# Patient Record
Sex: MALE | Race: OTHER | HISPANIC OR LATINO
[De-identification: names, ages, dates, MRNs, and addresses within clinical notes are randomized per-mention and may not be internally consistent; named-entity substitution may affect disease eponyms.]

---

## 2019-10-07 PROBLEM — Z00.00 ENCOUNTER FOR PREVENTIVE HEALTH EXAMINATION: Status: ACTIVE | Noted: 2019-10-07

## 2019-10-17 ENCOUNTER — APPOINTMENT (OUTPATIENT)
Dept: SURGERY | Facility: CLINIC | Age: 44
End: 2019-10-17
Payer: COMMERCIAL

## 2019-10-17 VITALS
BODY MASS INDEX: 28.88 KG/M2 | WEIGHT: 195 LBS | SYSTOLIC BLOOD PRESSURE: 117 MMHG | HEIGHT: 69 IN | HEART RATE: 78 BPM | DIASTOLIC BLOOD PRESSURE: 79 MMHG

## 2019-10-17 DIAGNOSIS — Z78.9 OTHER SPECIFIED HEALTH STATUS: ICD-10-CM

## 2019-10-17 PROCEDURE — 99203 OFFICE O/P NEW LOW 30 MIN: CPT

## 2019-10-20 NOTE — PHYSICAL EXAM
[de-identified] : left submandibular fullness, compressible [de-identified] : no palpable thyroid nodules [Laryngoscopy Performed] : laryngoscopy was performed, see procedure section for findings [Midline] : located in midline position [Normal] : cranial nerves 2-12 intact [de-identified] : could not cooperate for either indirect or flexible laryngoscopy

## 2019-10-20 NOTE — CONSULT LETTER
[Consult Letter:] : I had the pleasure of evaluating your patient, [unfilled]. [Dear  ___] : Dear ~MANUEL, [Please see my note below.] : Please see my note below. [Sincerely,] : Sincerely, [FreeTextEntry2] : Dr. Jitendra Fan [Consult Closing:] : Thank you very much for allowing me to participate in the care of this patient.  If you have any questions, please do not hesitate to contact me. [FreeTextEntry3] : Jacobo Turk MD, FACS\par System Director, Endocrine Surgery\par [facility]\par

## 2019-10-20 NOTE — HISTORY OF PRESENT ILLNESS
[de-identified] : Pt c/o neck masses since 2002 which vary is size spontaneously. notes occasional discomfort.  denies pain, drainage, infection, sour taste, dysphagia, hoarseness.    history of [place] exposure. \par CT:   left submandibular 8.3 cm mass, Right 6.7 cm submandibular mass Fullness Right hypopharynx

## 2019-10-21 ENCOUNTER — FORM ENCOUNTER (OUTPATIENT)
Age: 44
End: 2019-10-21

## 2019-10-22 ENCOUNTER — OUTPATIENT (OUTPATIENT)
Dept: OUTPATIENT SERVICES | Facility: HOSPITAL | Age: 44
LOS: 1 days | End: 2019-10-22
Payer: COMMERCIAL

## 2019-10-22 ENCOUNTER — APPOINTMENT (OUTPATIENT)
Dept: MRI IMAGING | Facility: IMAGING CENTER | Age: 44
End: 2019-10-22

## 2019-10-22 DIAGNOSIS — R22.1 LOCALIZED SWELLING, MASS AND LUMP, NECK: ICD-10-CM

## 2019-10-22 PROCEDURE — 70543 MRI ORBT/FAC/NCK W/O &W/DYE: CPT | Mod: 26

## 2019-10-22 PROCEDURE — A9585: CPT

## 2019-10-22 PROCEDURE — 70543 MRI ORBT/FAC/NCK W/O &W/DYE: CPT

## 2019-10-29 ENCOUNTER — OTHER (OUTPATIENT)
Age: 44
End: 2019-10-29

## 2019-11-11 ENCOUNTER — FORM ENCOUNTER (OUTPATIENT)
Age: 44
End: 2019-11-11

## 2019-11-12 ENCOUNTER — RESULT REVIEW (OUTPATIENT)
Age: 44
End: 2019-11-12

## 2019-11-12 ENCOUNTER — OUTPATIENT (OUTPATIENT)
Dept: OUTPATIENT SERVICES | Facility: HOSPITAL | Age: 44
LOS: 1 days | End: 2019-11-12
Payer: COMMERCIAL

## 2019-11-12 ENCOUNTER — APPOINTMENT (OUTPATIENT)
Dept: ULTRASOUND IMAGING | Facility: IMAGING CENTER | Age: 44
End: 2019-11-12
Payer: COMMERCIAL

## 2019-11-12 DIAGNOSIS — R22.1 LOCALIZED SWELLING, MASS AND LUMP, NECK: ICD-10-CM

## 2019-11-12 PROCEDURE — 88365 INSITU HYBRIDIZATION (FISH): CPT | Mod: 26,59

## 2019-11-12 PROCEDURE — 20206 BIOPSY MUSCLE PERQ NEEDLE: CPT

## 2019-11-12 PROCEDURE — 87205 SMEAR GRAM STAIN: CPT

## 2019-11-12 PROCEDURE — 88341 IMHCHEM/IMCYTCHM EA ADD ANTB: CPT | Mod: 26,59

## 2019-11-12 PROCEDURE — 88307 TISSUE EXAM BY PATHOLOGIST: CPT

## 2019-11-12 PROCEDURE — 88189 FLOWCYTOMETRY/READ 16 & >: CPT

## 2019-11-12 PROCEDURE — 88185 FLOWCYTOMETRY/TC ADD-ON: CPT

## 2019-11-12 PROCEDURE — 88307 TISSUE EXAM BY PATHOLOGIST: CPT | Mod: 26

## 2019-11-12 PROCEDURE — 88364 INSITU HYBRIDIZATION (FISH): CPT | Mod: 26

## 2019-11-12 PROCEDURE — 76942 ECHO GUIDE FOR BIOPSY: CPT

## 2019-11-12 PROCEDURE — 76942 ECHO GUIDE FOR BIOPSY: CPT | Mod: 26

## 2019-11-12 PROCEDURE — 88360 TUMOR IMMUNOHISTOCHEM/MANUAL: CPT

## 2019-11-12 PROCEDURE — 88364 INSITU HYBRIDIZATION (FISH): CPT

## 2019-11-12 PROCEDURE — 88173 CYTOPATH EVAL FNA REPORT: CPT

## 2019-11-12 PROCEDURE — 88184 FLOWCYTOMETRY/ TC 1 MARKER: CPT

## 2019-11-12 PROCEDURE — 88342 IMHCHEM/IMCYTCHM 1ST ANTB: CPT | Mod: 26,59

## 2019-11-12 PROCEDURE — 88365 INSITU HYBRIDIZATION (FISH): CPT

## 2019-11-12 PROCEDURE — 88360 TUMOR IMMUNOHISTOCHEM/MANUAL: CPT | Mod: 26

## 2019-11-12 PROCEDURE — 88342 IMHCHEM/IMCYTCHM 1ST ANTB: CPT

## 2019-11-12 PROCEDURE — 88341 IMHCHEM/IMCYTCHM EA ADD ANTB: CPT

## 2019-11-12 PROCEDURE — 88173 CYTOPATH EVAL FNA REPORT: CPT | Mod: 26

## 2019-11-12 PROCEDURE — 88172 CYTP DX EVAL FNA 1ST EA SITE: CPT

## 2019-11-15 LAB — TM INTERPRETATION: SIGNIFICANT CHANGE UP

## 2019-11-19 LAB — NON-GYNECOLOGICAL CYTOLOGY STUDY: SIGNIFICANT CHANGE UP

## 2019-11-22 ENCOUNTER — OTHER (OUTPATIENT)
Age: 44
End: 2019-11-22

## 2019-12-16 ENCOUNTER — OUTPATIENT (OUTPATIENT)
Dept: OUTPATIENT SERVICES | Facility: HOSPITAL | Age: 44
LOS: 1 days | End: 2019-12-16
Payer: COMMERCIAL

## 2019-12-16 VITALS
TEMPERATURE: 99 F | HEART RATE: 86 BPM | SYSTOLIC BLOOD PRESSURE: 120 MMHG | RESPIRATION RATE: 16 BRPM | HEIGHT: 68 IN | OXYGEN SATURATION: 98 % | DIASTOLIC BLOOD PRESSURE: 70 MMHG | WEIGHT: 190.04 LBS

## 2019-12-16 DIAGNOSIS — R59.9 ENLARGED LYMPH NODES, UNSPECIFIED: ICD-10-CM

## 2019-12-16 DIAGNOSIS — Z98.890 OTHER SPECIFIED POSTPROCEDURAL STATES: Chronic | ICD-10-CM

## 2019-12-16 LAB
ANION GAP SERPL CALC-SCNC: 14 MMO/L — SIGNIFICANT CHANGE UP (ref 7–14)
BUN SERPL-MCNC: 16 MG/DL — SIGNIFICANT CHANGE UP (ref 7–23)
CALCIUM SERPL-MCNC: 9.9 MG/DL — SIGNIFICANT CHANGE UP (ref 8.4–10.5)
CHLORIDE SERPL-SCNC: 103 MMOL/L — SIGNIFICANT CHANGE UP (ref 98–107)
CO2 SERPL-SCNC: 24 MMOL/L — SIGNIFICANT CHANGE UP (ref 22–31)
CREAT SERPL-MCNC: 1.01 MG/DL — SIGNIFICANT CHANGE UP (ref 0.5–1.3)
GLUCOSE SERPL-MCNC: 87 MG/DL — SIGNIFICANT CHANGE UP (ref 70–99)
HCT VFR BLD CALC: 42.6 % — SIGNIFICANT CHANGE UP (ref 39–50)
HGB BLD-MCNC: 13.6 G/DL — SIGNIFICANT CHANGE UP (ref 13–17)
MCHC RBC-ENTMCNC: 30.8 PG — SIGNIFICANT CHANGE UP (ref 27–34)
MCHC RBC-ENTMCNC: 31.9 % — LOW (ref 32–36)
MCV RBC AUTO: 96.6 FL — SIGNIFICANT CHANGE UP (ref 80–100)
NRBC # FLD: 0 K/UL — SIGNIFICANT CHANGE UP (ref 0–0)
PLATELET # BLD AUTO: 295 K/UL — SIGNIFICANT CHANGE UP (ref 150–400)
PMV BLD: 10.9 FL — SIGNIFICANT CHANGE UP (ref 7–13)
POTASSIUM SERPL-MCNC: 3.7 MMOL/L — SIGNIFICANT CHANGE UP (ref 3.5–5.3)
POTASSIUM SERPL-SCNC: 3.7 MMOL/L — SIGNIFICANT CHANGE UP (ref 3.5–5.3)
RBC # BLD: 4.41 M/UL — SIGNIFICANT CHANGE UP (ref 4.2–5.8)
RBC # FLD: 11.2 % — SIGNIFICANT CHANGE UP (ref 10.3–14.5)
SODIUM SERPL-SCNC: 141 MMOL/L — SIGNIFICANT CHANGE UP (ref 135–145)
WBC # BLD: 10.01 K/UL — SIGNIFICANT CHANGE UP (ref 3.8–10.5)
WBC # FLD AUTO: 10.01 K/UL — SIGNIFICANT CHANGE UP (ref 3.8–10.5)

## 2019-12-16 PROCEDURE — 93010 ELECTROCARDIOGRAM REPORT: CPT

## 2019-12-16 NOTE — H&P PST ADULT - HISTORY OF PRESENT ILLNESS
43y/o male presents for preop eval for scheduled left submental lymph node biopsy 12/27/2019. Per pt had swollen lymph node for several months.  Pt with h/o excision of lymph node from neck in 2002.  Per pt biopsy negative.

## 2019-12-16 NOTE — H&P PST ADULT - NSICDXPROBLEM_GEN_ALL_CORE_FT
PROBLEM DIAGNOSES  Problem: Enlarged lymph nodes, unspecified  Assessment and Plan: scheduled for left submental lymph node biopsy on 12/27/19  Written & verbal preop instructions, gi prophylaxis & surgical soap given  Pt verbalized good understanding.  Teach back done on surgical soap instructions.

## 2019-12-26 ENCOUNTER — TRANSCRIPTION ENCOUNTER (OUTPATIENT)
Age: 44
End: 2019-12-26

## 2019-12-26 PROBLEM — R59.9 ENLARGED LYMPH NODES, UNSPECIFIED: Chronic | Status: ACTIVE | Noted: 2019-12-16

## 2019-12-27 ENCOUNTER — APPOINTMENT (OUTPATIENT)
Dept: SURGERY | Facility: HOSPITAL | Age: 44
End: 2019-12-27

## 2019-12-27 ENCOUNTER — RESULT REVIEW (OUTPATIENT)
Age: 44
End: 2019-12-27

## 2019-12-27 ENCOUNTER — OUTPATIENT (OUTPATIENT)
Dept: OUTPATIENT SERVICES | Facility: HOSPITAL | Age: 44
LOS: 1 days | Discharge: HOME | End: 2019-12-27
Payer: COMMERCIAL

## 2019-12-27 ENCOUNTER — OTHER (OUTPATIENT)
Age: 44
End: 2019-12-27

## 2019-12-27 VITALS
OXYGEN SATURATION: 98 % | HEIGHT: 68 IN | HEART RATE: 88 BPM | TEMPERATURE: 99 F | WEIGHT: 190.04 LBS | RESPIRATION RATE: 16 BRPM | SYSTOLIC BLOOD PRESSURE: 117 MMHG | DIASTOLIC BLOOD PRESSURE: 75 MMHG

## 2019-12-27 VITALS
RESPIRATION RATE: 16 BRPM | OXYGEN SATURATION: 100 % | DIASTOLIC BLOOD PRESSURE: 67 MMHG | HEART RATE: 77 BPM | TEMPERATURE: 98 F | SYSTOLIC BLOOD PRESSURE: 108 MMHG

## 2019-12-27 DIAGNOSIS — R59.9 ENLARGED LYMPH NODES, UNSPECIFIED: ICD-10-CM

## 2019-12-27 DIAGNOSIS — Z98.890 OTHER SPECIFIED POSTPROCEDURAL STATES: Chronic | ICD-10-CM

## 2019-12-27 PROCEDURE — 88189 FLOWCYTOMETRY/READ 16 & >: CPT

## 2019-12-27 PROCEDURE — 88305 TISSUE EXAM BY PATHOLOGIST: CPT | Mod: 26

## 2019-12-27 PROCEDURE — 88342 IMHCHEM/IMCYTCHM 1ST ANTB: CPT | Mod: 26,59

## 2019-12-27 PROCEDURE — 21556 EXC NECK TUM DEEP < 5 CM: CPT

## 2019-12-27 PROCEDURE — G0452: CPT | Mod: 26

## 2019-12-27 PROCEDURE — 88367 INSITU HYBRIDIZATION AUTO: CPT | Mod: 26

## 2019-12-27 PROCEDURE — 88360 TUMOR IMMUNOHISTOCHEM/MANUAL: CPT | Mod: 26

## 2019-12-27 PROCEDURE — 88364 INSITU HYBRIDIZATION (FISH): CPT | Mod: 26

## 2019-12-27 PROCEDURE — 88365 INSITU HYBRIDIZATION (FISH): CPT | Mod: 26,59

## 2019-12-27 PROCEDURE — 88341 IMHCHEM/IMCYTCHM EA ADD ANTB: CPT | Mod: 26,59

## 2019-12-27 RX ORDER — ACETAMINOPHEN 500 MG
650 TABLET ORAL ONCE
Refills: 0 | Status: DISCONTINUED | OUTPATIENT
Start: 2019-12-27 | End: 2020-01-11

## 2019-12-27 RX ORDER — SODIUM CHLORIDE 9 MG/ML
1000 INJECTION, SOLUTION INTRAVENOUS
Refills: 0 | Status: DISCONTINUED | OUTPATIENT
Start: 2019-12-27 | End: 2020-01-11

## 2019-12-27 NOTE — ASU DISCHARGE PLAN (ADULT/PEDIATRIC) - SPECIFY DIET AND FLUID
Progress slowly  Increase fluids  Other (specify diet and fluid)  Keep well hydrated. Keep first meal light. Nothing fried, spicy or greasy. Increase fluids. Progress to regular diet as tolerated.

## 2019-12-27 NOTE — ASU DISCHARGE PLAN (ADULT/PEDIATRIC) - CALL YOUR DOCTOR IF YOU HAVE ANY OF THE FOLLOWING:
Bleeding that does not stop/Wound/Surgical Site with redness, or foul smelling discharge or pus/Fever greater than (need to indicate Fahrenheit or Celsius)/Numbness, tingling, color or temperature change to extremity/Pain not relieved by Medications/Swelling that gets worse

## 2019-12-27 NOTE — BRIEF OPERATIVE NOTE - COMMENTS
Patient required intubation.  Patient was agitated after extubation and required flumazenil reversal  Patient was stable after reversal and taken to PACU with face mask

## 2019-12-27 NOTE — ASU DISCHARGE PLAN (ADULT/PEDIATRIC) - CARE PROVIDER_API CALL
Jacobo Turk)  Plastic Surgery; Surgery  [address], [facility] [address]  [place] [address]  Phone: [phone]  Fax: [phone]  Established Patient  Follow Up Time: 2 weeks

## 2020-01-06 LAB
HEMATOPATHOLOGY REPORT: SIGNIFICANT CHANGE UP
TM INTERPRETATION: SIGNIFICANT CHANGE UP

## 2020-01-06 PROCEDURE — G0452: CPT | Mod: 26

## 2020-01-09 ENCOUNTER — APPOINTMENT (OUTPATIENT)
Dept: SURGERY | Facility: CLINIC | Age: 45
End: 2020-01-09
Payer: COMMERCIAL

## 2020-01-09 LAB — DNA PLOIDY SPEC FC-IMP: SIGNIFICANT CHANGE UP

## 2020-01-09 PROCEDURE — 99024 POSTOP FOLLOW-UP VISIT: CPT

## 2020-01-09 NOTE — PHYSICAL EXAM
[de-identified] : Mild postop swelling [Midline] : located in midline position [Normal] : orientation to person, place, and time: normal

## 2020-01-09 NOTE — ASSESSMENT
[FreeTextEntry1] : s/p Cervical lymph node biopsy\par daily care\par Path D/W with Dr Turk and reviewed with patient\par referral to Allergist and infectious disease phone numbers given\par f/u 2 months

## 2020-01-22 ENCOUNTER — LABORATORY RESULT (OUTPATIENT)
Age: 45
End: 2020-01-22

## 2020-01-22 ENCOUNTER — APPOINTMENT (OUTPATIENT)
Dept: ALLERGY | Facility: CLINIC | Age: 45
End: 2020-01-22
Payer: COMMERCIAL

## 2020-01-22 ENCOUNTER — APPOINTMENT (OUTPATIENT)
Dept: INFECTIOUS DISEASE | Facility: CLINIC | Age: 45
End: 2020-01-22

## 2020-01-22 VITALS
HEART RATE: 93 BPM | RESPIRATION RATE: 14 BRPM | SYSTOLIC BLOOD PRESSURE: 110 MMHG | HEIGHT: 69 IN | BODY MASS INDEX: 28.14 KG/M2 | WEIGHT: 190 LBS | DIASTOLIC BLOOD PRESSURE: 69 MMHG | OXYGEN SATURATION: 96 %

## 2020-01-22 PROCEDURE — 95004 PERQ TESTS W/ALRGNC XTRCS: CPT

## 2020-01-22 PROCEDURE — 99204 OFFICE O/P NEW MOD 45 MIN: CPT | Mod: 25

## 2020-01-22 NOTE — HISTORY OF PRESENT ILLNESS
[Asthma] : asthma [Allergic Rhinitis] : allergic rhinitis [Food Allergies] : food allergies [de-identified] : Patient [place] age 8 - his last visit was in 2014.    Patient was in the area of [facility] in 2001 - he was exposed to some of the dust plume.   In 2003 he was seen by ENT because he felt enlarged lymph node - advised benign.  He always felt some enlarged lymph nodes.   About 8 months ago he noted enlarging lymph nodes near his ears - symptoms come and go.   He then had a biopsy by Dr. Turk which revealed eosinophilic and lymphocytic infiltration.   \par \par Patient's last PE was around 2 years ago.   His last blood work was performed about 2 years ago.   No recent CXR.  No foreign travel.   Patient had HIV testing performed about 1 year ago.   He did not serve in .    Weight stable, energy and appetite good. \par \par Patient reports no wheezing or coughing

## 2020-01-22 NOTE — SOCIAL HISTORY
[Mother] : mother [Father] : father [Apartment] : [unfilled] lives in an apartment  [None] : none [Single] : single [FreeTextEntry2] :  - bankruptcy  [Smokers in Household] : there are no smokers in the home

## 2020-01-22 NOTE — ASSESSMENT
[FreeTextEntry1] : Lymphadenopathy of uncertain etiology - eosinophilic infiltrate noted on biopsy:\par \par See laboratory work up for lymphadenopathy with eosinophilia\par CXR to be obtained and patient may require CT chest/abdomen/pelvis \par Patient will be referred to hematology after all blood work up obtained.

## 2020-01-22 NOTE — PHYSICAL EXAM
[Alert] : alert [Well Nourished] : well nourished [Healthy Appearance] : healthy appearance [No Acute Distress] : no acute distress [Well Developed] : well developed [Normal Pupil & Iris Size/Symmetry] : normal pupil and iris size and symmetry [No Discharge] : no discharge [Sclera Not Icteric] : sclera not icteric [Normal Lips/Tongue] : the lips and tongue were normal [Normal Nasal Mucosa] : the nasal mucosa was normal [Normal Tonsils] : normal tonsils [Normal Dentition] : normal dentition [No Oral Lesions or Ulcers] : no oral lesions or ulcers [Supple] : the neck was supple [Boggy Nasal Turbinates] : no boggy and/or pale nasal turbinates [Normal Rate and Effort] : normal respiratory rhythm and effort [No Crackles] : no crackles [Bilateral Audible Breath Sounds] : bilateral audible breath sounds [Normal Rate] : heart rate was normal  [Normal S1, S2] : normal S1 and S2 [No murmur] : no murmur [Soft] : abdomen soft [Regular Rhythm] : with a regular rhythm [Not Tender] : non-tender [Not Distended] : not distended [No HSM] : no hepato-splenomegaly [Normal Axillary Lumph Nodes] : axillary [Skin Intact] : skin intact  [No Skin Lesions] : no skin lesions [No Joint Swelling or Erythema] : no joint swelling or erythema [No clubbing] : no clubbing [No Edema] : no edema [No Cyanosis] : no cyanosis [Normal Mood] : mood was normal [Normal Affect] : affect was normal [Alert, Awake, Oriented as Age-Appropriate] : alert, awake, oriented as age appropriate [de-identified] : enlarged cervical - elbow - inguinal lymph nodes  [de-identified] : large left side neck mass  [de-identified] : hyperpigmented hypertrophic patch along left elbow - hyperpigmented patches on face

## 2020-02-03 ENCOUNTER — APPOINTMENT (OUTPATIENT)
Dept: INFECTIOUS DISEASE | Facility: CLINIC | Age: 45
End: 2020-02-03
Payer: COMMERCIAL

## 2020-02-03 VITALS
OXYGEN SATURATION: 97 % | SYSTOLIC BLOOD PRESSURE: 118 MMHG | BODY MASS INDEX: 28.88 KG/M2 | RESPIRATION RATE: 18 BRPM | TEMPERATURE: 98.7 F | HEART RATE: 107 BPM | WEIGHT: 195 LBS | DIASTOLIC BLOOD PRESSURE: 73 MMHG | HEIGHT: 69 IN

## 2020-02-03 PROCEDURE — 99205 OFFICE O/P NEW HI 60 MIN: CPT

## 2020-02-03 NOTE — HISTORY OF PRESENT ILLNESS
[FreeTextEntry1] : 44 m originally from  with no significant PMH has had neck lymphadenopathy for years (over 15 years) which comes and goes and denied fever, chills, night sweats, weight loss, diarrhea, cough\par an MRI showed Multi-compartmental neck masses in the larynx, salivary glands, submandibular compartments, lymph nodes, with possible lacrimal gland enlargement. Some considerations include IgG4 related head and neck disease, lymphoma, sarcoidosis, amyloidosis, tuberculosis or other granulomatous diseases, with other neoplastic inflammatory conditions not excluded.\par s/p incisional biopsy and path showed soft tissue with eosinophilic and lymphocytic infiltrate in a background of vascular and fibrotic tissue\par pt also has hyper eosinophilia and the work up including strongyloides, trichinella, filaria were negative, had positive toxo IgG and negative IgM, also elevated IgE

## 2020-02-03 NOTE — PHYSICAL EXAM
[General Appearance - Alert] : alert [General Appearance - In No Acute Distress] : in no acute distress [General Appearance - Well Nourished] : well nourished [Sclera] : the sclera and conjunctiva were normal [Extraocular Movements] : extraocular movements were intact [Outer Ear] : the ears and nose were normal in appearance [Oropharynx] : the oropharynx was normal with no thrush [] : no respiratory distress [Auscultation Breath Sounds / Voice Sounds] : lungs were clear to auscultation bilaterally [Heart Rate And Rhythm] : heart rate was normal and rhythm regular [Heart Sounds] : normal S1 and S2 [Edema] : there was no peripheral edema [Bowel Sounds] : normal bowel sounds [Abdomen Soft] : soft [Abdomen Tenderness] : non-tender [Musculoskeletal - Swelling] : no joint swelling [No Focal Deficits] : no focal deficits [Oriented To Time, Place, And Person] : oriented to person, place, and time [Affect] : the affect was normal [FreeTextEntry1] : hyperpigmented scratch marks on L elbow

## 2020-02-03 NOTE — ASSESSMENT
[FreeTextEntry1] : 44 m originally from  with no significant PMH has had neck lymphadenopathy for years (over 15 years) which comes and goes and denied fever, chills, night sweats, weight loss, diarrhea, cough\par an MRI showed Multi-compartmental neck masses in the larynx, salivary glands, submandibular compartments, lymph nodes, with possible lacrimal gland enlargement. Some considerations include IgG4 related head and neck disease, lymphoma, sarcoidosis, amyloidosis, tuberculosis or other granulomatous diseases, with other neoplastic inflammatory conditions not excluded.\par s/p incisional biopsy and path showed soft tissue with eosinophilic and lymphocytic infiltrate in a background of vascular and fibrotic tissue\par pt also has hyper eosinophilia and the work up including strongyloides, trichinella, filaria, HIV were negative, had positive toxo IgG and negative IgM, also elevated IgE\par \par multi compartmental neck mass and lymphadenopathy with eosinophilia r/o malignancy vs parasitic infection\par \par * quantferon\par * GI PCR with O&P x 3 and toxocara Ab\par * chest/abd/pelvis CT to look for more LAD\par * hem/onc referral

## 2020-02-03 NOTE — REVIEW OF SYSTEMS
[Negative] : Heme/Lymph [Fever] : no fever [Chills] : no chills [Body Aches] : no body aches [Eye Pain] : no eye pain [Red Eyes] : eyes not red [Earache] : no earache [Nasal Discharge] : no nasal discharge [Loss Of Hearing] : no hearing loss [Chest Pain] : no chest pain [Palpitations] : no palpitations [Shortness Of Breath] : no shortness of breath [Wheezing] : no wheezing [Cough] : no cough [SOB on Exertion] : no shortness of breath during exertion [Abdominal Pain] : no abdominal pain [Vomiting] : no vomiting [Dysuria] : no dysuria [Penile Discharge] : no penile discharge [Joint Pain] : no joint pain [Joint Swelling] : no joint swelling [Skin Lesions] : no skin lesions [Skin Wound] : no skin wound [Confused] : no confusion [Convulsions] : no convulsions [Suicidal] : not suicidal [Anxiety] : no anxiety [Easy Bleeding] : no tendency for easy bleeding [Easy Bruising] : no tendency for easy bruising [FreeTextEntry4] : neck swelling

## 2020-02-07 LAB
M TB IFN-G BLD-IMP: NEGATIVE
QUANTIFERON TB PLUS MITOGEN MINUS NIL: 0.71 IU/ML
QUANTIFERON TB PLUS NIL: 0.06 IU/ML
QUANTIFERON TB PLUS TB1 MINUS NIL: 0.06 IU/ML
QUANTIFERON TB PLUS TB2 MINUS NIL: 0.07 IU/ML
T CANIS AB FLD-ACNC: NEGATIVE

## 2020-05-07 ENCOUNTER — APPOINTMENT (OUTPATIENT)
Dept: SURGERY | Facility: CLINIC | Age: 45
End: 2020-05-07
Payer: COMMERCIAL

## 2020-05-07 PROCEDURE — 99214 OFFICE O/P EST MOD 30 MIN: CPT | Mod: 95

## 2020-05-07 NOTE — ASSESSMENT
[FreeTextEntry1] : encouraged to f/u with AI and ID. given name of hematologist to further evaluate. given the opportunity to ask questions, and all questions have been answered to his satisfaction. to return earlier if any change

## 2020-05-07 NOTE — REASON FOR VISIT
[Home] : at home, [unfilled] , at the time of the visit. [Medical Office: ([facility])___] : at the medical office located in  [Patient] : the patient [Follow-Up: _____] : a [unfilled] follow-up visit

## 2020-05-07 NOTE — HISTORY OF PRESENT ILLNESS
[de-identified] : t/c from pt inquiring about telehealth to avoid face to face visit. before the visit, the patient was informed about the limitations and ramifications of telehealth and the potential for HIPAA non compliance.  the patient was at home and no other participants in the call.  the patient then agreed to proceed with the visit.  Doorbot could not be used since pts internet access would not complete the connection.  pt sent email of photo of area and remainder of visit conducted using telephone.  \par 5 months s/p incisional biopsy neck mass. has seen AI and ID and had blood tests.  scans pending.  has not followed up since early February. denies any symptoms, dysphagia, hoarseness or new lesions. no changes medically since last visit

## 2020-06-16 ENCOUNTER — APPOINTMENT (OUTPATIENT)
Dept: ALLERGY | Facility: CLINIC | Age: 45
End: 2020-06-16
Payer: COMMERCIAL

## 2020-06-16 PROCEDURE — 99213 OFFICE O/P EST LOW 20 MIN: CPT | Mod: 95

## 2020-06-16 NOTE — ASSESSMENT
[FreeTextEntry1] : Hypereosinophilia and adenopathy:\par \par CT of chest/abdomen/pelvis\par Fish fusion gene \par I have reviewed the necessity to complete work up with patient\par \par This visit was provided via telehealth using real time 2-way audio visual technology.  The patient,Alba Sevilla, was located at home,at the time of the visit.   The provider, Mitchell Boxer, M.D., was located at the office, [address] [place] [address], at the time of the visit.\par \par The patient, Alba Sevilla and physician, Mitchell Boxer, M.D., participated in the telehealth encounter.\par \par Verbal consent obtained by  from patient.\par \par The majority of time (>50%) was spent on counseling and coordination of care with this patient and/or family member.  The approximate physician face to face time was 15 minutes for the diagnosis of eosinophilia\par \par

## 2020-06-16 NOTE — HISTORY OF PRESENT ILLNESS
[Home] : at home, [unfilled] , at the time of the visit. [Medical Office: ([facility])___] : at the medical office located in  [Verbal consent obtained from patient] : the patient, [unfilled] [Asthma] : asthma [Allergic Rhinitis] : allergic rhinitis [Eczematous rashes] : eczematous rashes [Venom Reactions] : venom reactions [Food Allergies] : food allergies [de-identified] : Dr Turk requested that I follow up with Mr. Sevilla - I advised Dr. Turk that we attempted to follow up to no avail but would give him another call.   Today I discussed with patient the need to continue his investigation of his eosinophilia.  When I mentioned this to patient he questioned eosinophilia and claimed that he was not aware of this problem.  He has been feeling well otherwise.

## 2020-07-06 ENCOUNTER — OUTPATIENT (OUTPATIENT)
Dept: OUTPATIENT SERVICES | Facility: HOSPITAL | Age: 45
LOS: 1 days | End: 2020-07-06
Payer: MEDICAID

## 2020-07-06 ENCOUNTER — APPOINTMENT (OUTPATIENT)
Dept: CT IMAGING | Facility: IMAGING CENTER | Age: 45
End: 2020-07-06
Payer: COMMERCIAL

## 2020-07-06 DIAGNOSIS — D72.1 EOSINOPHILIA: ICD-10-CM

## 2020-07-06 DIAGNOSIS — Z98.890 OTHER SPECIFIED POSTPROCEDURAL STATES: Chronic | ICD-10-CM

## 2020-07-06 PROCEDURE — 74176 CT ABD & PELVIS W/O CONTRAST: CPT

## 2020-07-06 PROCEDURE — 71250 CT THORAX DX C-: CPT

## 2020-07-06 PROCEDURE — 74176 CT ABD & PELVIS W/O CONTRAST: CPT | Mod: 26

## 2020-07-06 PROCEDURE — 71250 CT THORAX DX C-: CPT | Mod: 26

## 2020-07-16 ENCOUNTER — APPOINTMENT (OUTPATIENT)
Dept: ALLERGY | Facility: CLINIC | Age: 45
End: 2020-07-16
Payer: COMMERCIAL

## 2020-07-16 PROCEDURE — 99213 OFFICE O/P EST LOW 20 MIN: CPT | Mod: 95

## 2020-07-16 NOTE — ASSESSMENT
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 Goldberg - hematology.   I have made it clear that he needs a definitive diagnosis in order to implement treatment for his elevated eosinophil count.  He does understand after I repeated the importance of follow up and agrees to comply. \par \par This visit was provided via telehealth using real time 2-way audio visual technology.  The patient, Alba Sevilla, was located at home, at the time of the visit.   The provider, Mitchell Boxer, M.D., was located at the office, [address] [place] [address], at the time of the visit.\par \par The patient, Alba Sevilla and physician, Mitchell Boxer, M.D., participated in the telehealth encounter.\par \par Verbal consent obtained by  from patient.\par \par The majority of time (>50%) was spent on counseling and coordination of care with this patient and/or family member.  The approximate physician face to face time was 15 minutes for the diagnosis of hypereosinophilia\par

## 2020-07-16 NOTE — PHYSICAL EXAM
[Alert] : alert [Well Nourished] : well nourished [No Acute Distress] : no acute distress [Healthy Appearance] : healthy appearance [Well Developed] : well developed [Normal Voice/Communication] : normal voice communication [Wheezing] : no wheezing was heard [Normal Mood] : mood was normal [Normal Affect] : affect was normal [Alert, Awake, Oriented as Age-Appropriate] : alert, awake, oriented as age appropriate [de-identified] : not appropriate he understands and then forgets what I have told him  [de-identified] : no audible wheeze

## 2020-07-16 NOTE — HISTORY OF PRESENT ILLNESS
[Home] : at home, [unfilled] , at the time of the visit. [Medical Office: ([facility])___] : at the medical office located in  [Verbal consent obtained from patient] : the patient, [unfilled] [de-identified] : Spoke with patient about results of CT scans and absolute need to follow up with Dr. Goldberg.   He is confused about what his problem it despite being told on multiple occasions that he has a very high eosinophil count and lymph node enlargement.

## 2020-07-22 ENCOUNTER — OUTPATIENT (OUTPATIENT)
Dept: OUTPATIENT SERVICES | Facility: HOSPITAL | Age: 45
LOS: 1 days | Discharge: HOME | End: 2020-07-22

## 2020-07-22 DIAGNOSIS — Z98.890 OTHER SPECIFIED POSTPROCEDURAL STATES: Chronic | ICD-10-CM

## 2020-07-22 DIAGNOSIS — D72.89 OTHER SPECIFIED DISORDERS OF WHITE BLOOD CELLS: ICD-10-CM

## 2020-07-28 ENCOUNTER — APPOINTMENT (OUTPATIENT)
Dept: HEMATOLOGY ONCOLOGY | Facility: CLINIC | Age: 45
End: 2020-07-28
Payer: COMMERCIAL

## 2020-07-28 PROCEDURE — 99205 OFFICE O/P NEW HI 60 MIN: CPT | Mod: 95

## 2020-07-28 NOTE — REASON FOR VISIT
[Initial Consultation] : an initial consultation for [FreeTextEntry2] : hypereosinophilia and lymphadenopathy

## 2020-07-28 NOTE — REVIEW OF SYSTEMS
[Chills] : no chills [Fever] : no fever [Night Sweats] : no night sweats [Fatigue] : no fatigue [Recent Change In Weight] : ~T no recent weight change [Dysphagia] : no dysphagia [Vision Problems] : no vision problems [Nosebleeds] : no nosebleeds [Chest Pain] : no chest pain [Odynophagia] : no odynophagia [Shortness Of Breath] : no shortness of breath [Palpitations] : no palpitations [Wheezing] : no wheezing [Cough] : no cough [SOB on Exertion] : no shortness of breath during exertion [Abdominal Pain] : no abdominal pain [Diarrhea] : no diarrhea [Vomiting] : no vomiting [Dysuria] : no dysuria [Joint Pain] : no joint pain [Joint Stiffness] : no joint stiffness [Skin Rash] : no skin rash [Fainting] : no fainting [Dizziness] : no dizziness [Anxiety] : no anxiety [Easy Bleeding] : no tendency for easy bleeding [Depression] : no depression [Easy Bruising] : no tendency for easy bruising [de-identified] : itching over lesions

## 2020-07-28 NOTE — PHYSICAL EXAM
[Fully active, able to carry on all pre-disease performance without restriction] : Status 0 - Fully active, able to carry on all pre-disease performance without restriction [de-identified] : Cannot perform physical exam due to nature of telemedicine visit, however on telemedicine patient appears to not be in any acute distress

## 2020-07-28 NOTE — CONSULT LETTER
[Dear  ___] : Dear  [unfilled], [Please see my note below.] : Please see my note below. [Consult Letter:] : I had the pleasure of evaluating your patient, [unfilled]. [Sincerely,] : Sincerely, [Consult Closing:] : Thank you very much for allowing me to participate in the care of this patient.  If you have any questions, please do not hesitate to contact me. [FreeTextEntry3] : Bradley Goldberg M.D. \par Hematology/Oncology\par [facility]\par [phone]\par

## 2020-07-28 NOTE — RESULTS/DATA
[FreeTextEntry1] : FNA (11/12/2019)\par Final Diagnosis\par NECK, SUBMENTAL, LEFT, US GUIDED FNA\par ATYPICAL FINDINGS.  See note.\par \par Note:\par Cytology slides and cell block show lymphocytes with scattered\par larger cells in a background of many eosinophils.\par \par Core biopsy shows fragments of fibrovascular soft tissue with\par dense lymphoid infiltrate composed of small lymphocytes, many\par eosinophils, few plasma cells, and rare larger cells.Additional immunohistochemical stains (CD1a, ) performed on\par block 1C show mildly increased mast cells\par (including some spindle forms) and no significant increase in\par CD1a positive cells.  Suggest clinical correlation.\par \par \par \par Excisional biopsy, L neck mass 12/27/2019\par Final Diagnosis:\par 1.  Neck mass, left, incisional biopsy:\par - Soft tissue with eosinophilic and lymphocytic infiltrate\par in a background of vascular proliferation and fibrosis\par \par Diagnostic Note:\par Per chart review, patient has extensive nonspecific enhancing\par soft tissue involving the bilateral submandibular spaces,\par directly contiguous with the submandibular glands and multiple\par neck lymphadenopathies. Current biopsy shows soft tissue with\par extensive eosinophilic and lymphocytic infiltrate in a background\par of vascular proliferation and fibrosis. Overall there is no\par morphologic or immunophenotypic evidence supporting involvement\par by leukemia/lymphoma in this biopsy The differential diagnosis\par includes but not limited to, Kimura disease, angiolymphoid\par hyperplasia with eosinophilia (ALHE), parasite infection related\par eosinophilia and hypereosinophilic syndrome. Correlation with\par clinical findings (e.g. CBC for hypereosinophilia, serum IgE\par levels) is recommended.\par \par \par \par   CT Chest No Cont             Final\par \par No Documents Attached\par \par \par \par \par   EXAM:  CT CHEST\par \par EXAM:  CT ABDOMEN AND PELVIS\par PROCEDURE DATE:  07/06/2020\par \par FINDINGS:\par Evaluation of the solid organ parenchyma is limited due to the lack of intravenous contrast.\par \par CHEST:\par LUNGS AND LARGE AIRWAYS: Patent central airways. No pulmonary nodules.\par PLEURA: No pleural effusion.\par VESSELS: Within normal limits.\par HEART: Heart size is normal. No pericardial effusion.\par MEDIASTINUM AND ELLIE: Mildly enlarged bilateral axillary lymph nodes are visualized. For example, a left axillary lymph node measures 2.5 x 1.8 cm (series 2, image 11) and a right axillary lymph node measures 2.3 x 1.9 cm (series 2, image 12). No mediastinal or hilar lymphadenopathy is noted.\par CHEST WALL AND LOWER NECK: There is bilateral gynecomastia.\par ABDOMEN AND PELVIS:\par LIVER: Within normal limits.\par BILE DUCTS: Normal caliber.\par GALLBLADDER: Within normal limits.\par SPLEEN: Within normal limits.\par PANCREAS: Within normal limits.\par ADRENALS: Within normal limits.\par KIDNEYS/URETERS: Within normal limits. No renal calculi or hydronephrosis.\par BLADDER: Within normal limits.\par REPRODUCTIVE ORGANS: The prostate gland is mildly enlarged.\par BOWEL: No bowel obstruction. Appendix is within normal limits.\par PERITONEUM: No ascites.\par VESSELS: Within normal limits.\par RETROPERITONEUM/LYMPH NODES: Mildly enlarged pelvic lymph nodes are noted. A left external iliac lymph node measures 2.5 x 1.3 cm (series 2, image 151). A right external iliac lymph node measures 2.4 x 1.2 cm (series 2, image 147). A right inguinal lymph node measures 2.1 x 1.5 cm (series 2, image 168).\par ABDOMINAL WALL: Small fat-containing umbilical hernia.\par BONES: Within normal limits.\par \par IMPRESSION:\par Bilateral gynecomastia. Clinical correlation is recommended.\par Several mildly enlarged lymph nodes are appreciated including bilateral axillary lymph nodes and bilateral external iliac lymph nodes. If it would be clinically helpful, a number of these lymph nodes are amenable to ultrasound-guided biopsy.

## 2020-07-28 NOTE — ASSESSMENT
[FreeTextEntry1] : 45 y/o M with peripheral eosinophilia and chronic lymphadenopathy of unclear etiology. \par \par -Discussed with patient the possibly of hypereosinophilic syndromes, including those involving the lymphatic system (HES-L). Discussed the difficulty in making this diagnosis based on only results from peripheral bloodwork. Patient without indication of any serious systemic symptoms. \par -Lymphadenopathy concerning, however no urgency to evaluation at this point. Discussed bone marrow biopsy with patient who was hesitant due to fear. Recommended bone marrow biopsy for assistance with definitive diagnosis, but opting for peripheral blood studies first. Will check peripheral blood FISH for CHIC2 locus to evaluate for HES. Will also check T-cell receptor rearrangements, BCR/ABL, and JAK2. Will check flow cytometry as well on peripheral blood. \par -Unlikely Myeloid related HES given normal tryptase and low B12 level. Will send for B12 supplementation. \par -Ddx: HES NOS, Kimura disease, HES-L, other histiocytosis with eosinophilia. Treatment to most likely include systemic corticosteroids. \par RTC in 1 month. Discussed with Dr. Boxer. \par \par The visit was completed via tele-medicine visit due to the restrictions of the COVID-19 pandemic. All issues as above were discussed and addressed but no physical exam was performed. If it was felt that the patient should be evaluated in the clinic then they were directed there. The patient verbally consented to visit.\par

## 2020-07-28 NOTE — HISTORY OF PRESENT ILLNESS
[Home] : at home, [unfilled] , at the time of the visit. [Medical Office: ([facility])___] : at the medical office located in  [Verbal consent obtained from patient] : the patient, [unfilled] [de-identified] : 43 y/o M with unremarkable medical history who presents as referral from Dr. Boxer's office with hypereosinophilia. As per the patient he has dealt with swollen lymph nodes under his neck dating back to 2002. During that time he had a surgery to excise the lymph node and he said that it didn't show anything, and he didn't deal with it again until the past "few years". According to the patient, he has now dealt with waxing and waning lymphadenopathy which when it comes is slightly bothersome to him with itching and irritation. Also, he says you can see it and it is cosmetically disfiguring. He denies any recent travel. He denies any fatigue or unintentional weight loss, no night sweats or fevers. He denies any hives, rashes or angioedema like symptoms. He denies dyspnea, either at rest or on exertion, and has no wheezing symptoms. he reports that he is otherwise feeling in his usual state of health. He denies N/V/D, and has had normal stool.\par \par In Nov 2019 he had an FNA of his lymph node (L submental) which showed lymphocytes with scattered larger cells in the background of many eosinophils, with mildly increased mast cells. Dec 2019 had excisional biopsy of left neck mass which showed soft tissue with eosinophilic and lymphocytic infiltrate in a background of vascular proliferation and fibrosis. He is here for hematologic evaluation.

## 2020-07-29 ENCOUNTER — OUTPATIENT (OUTPATIENT)
Dept: OUTPATIENT SERVICES | Facility: HOSPITAL | Age: 45
LOS: 1 days | End: 2020-07-29
Payer: COMMERCIAL

## 2020-07-29 ENCOUNTER — RESULT REVIEW (OUTPATIENT)
Age: 45
End: 2020-07-29

## 2020-07-29 ENCOUNTER — APPOINTMENT (OUTPATIENT)
Dept: HEMATOLOGY ONCOLOGY | Facility: CLINIC | Age: 45
End: 2020-07-29

## 2020-07-29 DIAGNOSIS — Z98.890 OTHER SPECIFIED POSTPROCEDURAL STATES: Chronic | ICD-10-CM

## 2020-07-29 DIAGNOSIS — D72.1 EOSINOPHILIA: ICD-10-CM

## 2020-07-29 LAB
BASOPHILS # BLD AUTO: 0.12 K/UL — SIGNIFICANT CHANGE UP (ref 0–0.2)
BASOPHILS NFR BLD AUTO: 1 % — SIGNIFICANT CHANGE UP (ref 0–2)
EOSINOPHIL # BLD AUTO: 2.73 K/UL — HIGH (ref 0–0.5)
EOSINOPHIL NFR BLD AUTO: 23 % — HIGH (ref 0–6)
HCT VFR BLD CALC: 44.6 % — SIGNIFICANT CHANGE UP (ref 39–50)
HGB BLD-MCNC: 15.1 G/DL — SIGNIFICANT CHANGE UP (ref 13–17)
LYMPHOCYTES # BLD AUTO: 1.78 K/UL — SIGNIFICANT CHANGE UP (ref 1–3.3)
LYMPHOCYTES # BLD AUTO: 15 % — SIGNIFICANT CHANGE UP (ref 13–44)
MCHC RBC-ENTMCNC: 31.9 PG — SIGNIFICANT CHANGE UP (ref 27–34)
MCHC RBC-ENTMCNC: 33.9 GM/DL — SIGNIFICANT CHANGE UP (ref 32–36)
MCV RBC AUTO: 94.1 FL — SIGNIFICANT CHANGE UP (ref 80–100)
MONOCYTES # BLD AUTO: 0.59 K/UL — SIGNIFICANT CHANGE UP (ref 0–0.9)
MONOCYTES NFR BLD AUTO: 5 % — SIGNIFICANT CHANGE UP (ref 2–14)
NEUTROPHILS # BLD AUTO: 6.64 K/UL — SIGNIFICANT CHANGE UP (ref 1.8–7.4)
NEUTROPHILS NFR BLD AUTO: 56 % — SIGNIFICANT CHANGE UP (ref 43–77)
NRBC # BLD: 0 /100 — SIGNIFICANT CHANGE UP (ref 0–0)
NRBC # BLD: SIGNIFICANT CHANGE UP /100 WBCS (ref 0–0)
PLAT MORPH BLD: NORMAL — SIGNIFICANT CHANGE UP
PLATELET # BLD AUTO: 271 K/UL — SIGNIFICANT CHANGE UP (ref 150–400)
RBC # BLD: 4.74 M/UL — SIGNIFICANT CHANGE UP (ref 4.2–5.8)
RBC # FLD: 11.8 % — SIGNIFICANT CHANGE UP (ref 10.3–14.5)
RBC BLD AUTO: SIGNIFICANT CHANGE UP
WBC # BLD: 11.85 K/UL — HIGH (ref 3.8–10.5)
WBC # FLD AUTO: 11.85 K/UL — HIGH (ref 3.8–10.5)

## 2020-07-29 PROCEDURE — G0452: CPT | Mod: 26

## 2020-07-29 PROCEDURE — 81270 JAK2 GENE: CPT

## 2020-07-29 PROCEDURE — 88237 TISSUE CULTURE BONE MARROW: CPT

## 2020-07-29 PROCEDURE — 81206 BCR/ABL1 GENE MAJOR BP: CPT

## 2020-07-29 PROCEDURE — 81340 TRB@ GENE REARRANGE AMPLIFY: CPT

## 2020-07-29 PROCEDURE — 81342 TRG GENE REARRANGEMENT ANAL: CPT

## 2020-07-29 PROCEDURE — 88185 FLOWCYTOMETRY/TC ADD-ON: CPT

## 2020-07-29 PROCEDURE — 88189 FLOWCYTOMETRY/READ 16 & >: CPT

## 2020-07-29 PROCEDURE — 88291 CYTO/MOLECULAR REPORT: CPT | Mod: 59

## 2020-07-29 PROCEDURE — 81207 BCR/ABL1 GENE MINOR BP: CPT

## 2020-07-29 PROCEDURE — 88275 CYTOGENETICS 100-300: CPT

## 2020-07-29 PROCEDURE — 88184 FLOWCYTOMETRY/ TC 1 MARKER: CPT

## 2020-07-29 PROCEDURE — 88271 CYTOGENETICS DNA PROBE: CPT

## 2020-07-29 PROCEDURE — 87205 SMEAR GRAM STAIN: CPT

## 2020-07-31 LAB
CHROM ANALY INTERPHASE BLD FISH-IMP: SIGNIFICANT CHANGE UP
JAK2 P.V617F BLD/T QL: SIGNIFICANT CHANGE UP
TM INTERPRETATION: SIGNIFICANT CHANGE UP

## 2020-08-05 LAB — BCR/ABL BY RT - PCR QUANTITATIVE: SIGNIFICANT CHANGE UP

## 2020-08-06 LAB — DNA PLOIDY SPEC FC-IMP: SIGNIFICANT CHANGE UP

## 2020-08-14 LAB — METHYLMALONATE SERPL-SCNC: 1323 NMOL/L

## 2020-08-19 ENCOUNTER — OUTPATIENT (OUTPATIENT)
Dept: OUTPATIENT SERVICES | Facility: HOSPITAL | Age: 45
LOS: 1 days | End: 2020-08-19
Payer: COMMERCIAL

## 2020-08-19 ENCOUNTER — APPOINTMENT (OUTPATIENT)
Dept: CV DIAGNOSITCS | Facility: HOSPITAL | Age: 45
End: 2020-08-19

## 2020-08-19 DIAGNOSIS — Z98.890 OTHER SPECIFIED POSTPROCEDURAL STATES: Chronic | ICD-10-CM

## 2020-08-19 DIAGNOSIS — D72.1 EOSINOPHILIA: ICD-10-CM

## 2020-08-19 PROCEDURE — 93306 TTE W/DOPPLER COMPLETE: CPT

## 2020-08-19 PROCEDURE — 93306 TTE W/DOPPLER COMPLETE: CPT | Mod: 26

## 2020-08-20 LAB
JAK2 P.V617F BLD/T QL: NEGATIVE
JAK2RLX: NEGATIVE

## 2020-08-21 ENCOUNTER — OUTPATIENT (OUTPATIENT)
Dept: OUTPATIENT SERVICES | Facility: HOSPITAL | Age: 45
LOS: 1 days | Discharge: HOME | End: 2020-08-21

## 2020-08-21 DIAGNOSIS — Z98.890 OTHER SPECIFIED POSTPROCEDURAL STATES: Chronic | ICD-10-CM

## 2020-08-21 DIAGNOSIS — D72.89 OTHER SPECIFIED DISORDERS OF WHITE BLOOD CELLS: ICD-10-CM

## 2020-08-26 ENCOUNTER — APPOINTMENT (OUTPATIENT)
Dept: INFUSION THERAPY | Facility: HOSPITAL | Age: 45
End: 2020-08-26

## 2020-08-27 ENCOUNTER — APPOINTMENT (OUTPATIENT)
Dept: INFUSION THERAPY | Facility: HOSPITAL | Age: 45
End: 2020-08-27

## 2020-08-28 ENCOUNTER — APPOINTMENT (OUTPATIENT)
Dept: INFUSION THERAPY | Facility: HOSPITAL | Age: 45
End: 2020-08-28

## 2020-08-31 ENCOUNTER — APPOINTMENT (OUTPATIENT)
Dept: INFUSION THERAPY | Facility: HOSPITAL | Age: 45
End: 2020-08-31

## 2020-09-03 ENCOUNTER — APPOINTMENT (OUTPATIENT)
Dept: INFUSION THERAPY | Facility: HOSPITAL | Age: 45
End: 2020-09-03

## 2020-09-04 DIAGNOSIS — C16.0 MALIGNANT NEOPLASM OF CARDIA: ICD-10-CM

## 2020-09-14 ENCOUNTER — RESULT REVIEW (OUTPATIENT)
Age: 45
End: 2020-09-14

## 2020-09-14 ENCOUNTER — APPOINTMENT (OUTPATIENT)
Dept: INFUSION THERAPY | Facility: HOSPITAL | Age: 45
End: 2020-09-14

## 2020-09-14 ENCOUNTER — APPOINTMENT (OUTPATIENT)
Dept: HEMATOLOGY ONCOLOGY | Facility: CLINIC | Age: 45
End: 2020-09-14
Payer: COMMERCIAL

## 2020-09-14 VITALS
SYSTOLIC BLOOD PRESSURE: 114 MMHG | OXYGEN SATURATION: 98 % | DIASTOLIC BLOOD PRESSURE: 76 MMHG | TEMPERATURE: 98 F | WEIGHT: 211.64 LBS | RESPIRATION RATE: 18 BRPM | BODY MASS INDEX: 31.25 KG/M2 | HEART RATE: 88 BPM

## 2020-09-14 LAB
BASOPHILS # BLD AUTO: 0 K/UL — SIGNIFICANT CHANGE UP (ref 0–0.2)
BASOPHILS NFR BLD AUTO: 0 % — SIGNIFICANT CHANGE UP (ref 0–2)
EOSINOPHIL # BLD AUTO: 3.63 K/UL — HIGH (ref 0–0.5)
EOSINOPHIL NFR BLD AUTO: 33 % — HIGH (ref 0–6)
HCT VFR BLD CALC: 45.5 % — SIGNIFICANT CHANGE UP (ref 39–50)
HGB BLD-MCNC: 14.8 G/DL — SIGNIFICANT CHANGE UP (ref 13–17)
LYMPHOCYTES # BLD AUTO: 1.21 K/UL — SIGNIFICANT CHANGE UP (ref 1–3.3)
LYMPHOCYTES # BLD AUTO: 11 % — LOW (ref 13–44)
MCHC RBC-ENTMCNC: 31.4 PG — SIGNIFICANT CHANGE UP (ref 27–34)
MCHC RBC-ENTMCNC: 32.5 G/DL — SIGNIFICANT CHANGE UP (ref 32–36)
MCV RBC AUTO: 96.6 FL — SIGNIFICANT CHANGE UP (ref 80–100)
METAMYELOCYTES # FLD: 9 % — HIGH (ref 0–0)
MONOCYTES # BLD AUTO: 0.77 K/UL — SIGNIFICANT CHANGE UP (ref 0–0.9)
MONOCYTES NFR BLD AUTO: 7 % — SIGNIFICANT CHANGE UP (ref 2–14)
NEUTROPHILS # BLD AUTO: 4.4 K/UL — SIGNIFICANT CHANGE UP (ref 1.8–7.4)
NEUTROPHILS NFR BLD AUTO: 40 % — LOW (ref 43–77)
NRBC # BLD: 0 /100 — SIGNIFICANT CHANGE UP (ref 0–0)
NRBC # BLD: SIGNIFICANT CHANGE UP /100 WBCS (ref 0–0)
PLAT MORPH BLD: NORMAL — SIGNIFICANT CHANGE UP
PLATELET # BLD AUTO: 276 K/UL — SIGNIFICANT CHANGE UP (ref 150–400)
RBC # BLD: 4.71 M/UL — SIGNIFICANT CHANGE UP (ref 4.2–5.8)
RBC # FLD: 11.5 % — SIGNIFICANT CHANGE UP (ref 10.3–14.5)
RBC BLD AUTO: SIGNIFICANT CHANGE UP
WBC # BLD: 10.99 K/UL — HIGH (ref 3.8–10.5)
WBC # FLD AUTO: 10.99 K/UL — HIGH (ref 3.8–10.5)

## 2020-09-14 PROCEDURE — 99214 OFFICE O/P EST MOD 30 MIN: CPT

## 2020-09-14 NOTE — ASSESSMENT
[FreeTextEntry1] : 45 y/o M with peripheral eosinophilia and chronic lymphadenopathy of unclear etiology. \par \par -Discussed with patient the possibly of hypereosinophilic syndromes, including those involving the lymphatic system (HES-L). Discussed the difficulty in making this diagnosis based on only results from peripheral bloodwork. Patient without indication of any serious systemic symptoms. \par -Patient refused BMBx. \par -Peripheral blood shows negative JAK2, BCR-ABL, normal FISH for PDGFRA. \par -Unlikely Myeloid related HES given normal tryptase and low B12 level. VERY high MMA level unclear how this relates to lymphadenopathy / mass, but giving IM supplementation. Checking today for anti intrinsic factor and parietal cell antibodies.  \par -Patient to see GI as concern for malabsorption for B12 deficiency. Parasite? All blood tests negative thus far for parasites.  \par -Ddx: HES NOS, Kimura disease, HES-L, other histiocytosis with eosinophilia. Treatment to most likely include systemic corticosteroids. \par RTC in 1 month.

## 2020-09-14 NOTE — REVIEW OF SYSTEMS
[Fever] : no fever [Chills] : no chills [Night Sweats] : no night sweats [Fatigue] : no fatigue [Recent Change In Weight] : ~T no recent weight change [Vision Problems] : no vision problems [Dysphagia] : no dysphagia [Nosebleeds] : no nosebleeds [Odynophagia] : no odynophagia [Chest Pain] : no chest pain [Palpitations] : no palpitations [Shortness Of Breath] : no shortness of breath [Wheezing] : no wheezing [Cough] : no cough [Abdominal Pain] : no abdominal pain [SOB on Exertion] : no shortness of breath during exertion [Vomiting] : no vomiting [Diarrhea] : no diarrhea [Dysuria] : no dysuria [Joint Pain] : no joint pain [Joint Stiffness] : no joint stiffness [Dizziness] : no dizziness [Skin Rash] : no skin rash [Anxiety] : no anxiety [Fainting] : no fainting [Depression] : no depression [Easy Bleeding] : no tendency for easy bleeding [de-identified] : itching over lesions [Easy Bruising] : no tendency for easy bruising

## 2020-09-14 NOTE — PHYSICAL EXAM
[Fully active, able to carry on all pre-disease performance without restriction] : Status 0 - Fully active, able to carry on all pre-disease performance without restriction [Normal] : affect appropriate [de-identified] : mid-left large mass, soft and nontender with multinodular feel to it.  [de-identified] : small L axillary lymph node palpated deeply, around 0.5 cm. Nontender. Otherwise no other peripheral lymphadenopathy, L anterior cervical chain mass noted.

## 2020-09-14 NOTE — HISTORY OF PRESENT ILLNESS
[de-identified] : 45 y/o M with unremarkable medical history who presents as referral from Dr. Boxer's office with hypereosinophilia. As per the patient he has dealt with swollen lymph nodes under his neck dating back to 2002. During that time he had a surgery to excise the lymph node and he said that it didn't show anything, and he didn't deal with it again until the past "few years". According to the patient, he has now dealt with waxing and waning lymphadenopathy which when it comes is slightly bothersome to him with itching and irritation. Also, he says you can see it and it is cosmetically disfiguring. He denies any recent travel. He denies any fatigue or unintentional weight loss, no night sweats or fevers. He denies any hives, rashes or angioedema like symptoms. He denies dyspnea, either at rest or on exertion, and has no wheezing symptoms. he reports that he is otherwise feeling in his usual state of health. He denies N/V/D, and has had normal stool.\par \par In Nov 2019 he had an FNA of his lymph node (L submental) which showed lymphocytes with scattered larger cells in the background of many eosinophils, with mildly increased mast cells. Dec 2019 had excisional biopsy of left neck mass which showed soft tissue with eosinophilic and lymphocytic infiltrate in a background of vascular proliferation and fibrosis. He is here for hematologic evaluation. \par \par On subsequent evaluation patient was found with severe B12 deficiency with extremely elevated serum MMA level. He is now receiving B12 injections for repletion.  [de-identified] : Patient reports he feels much the same as previous with no interval changes. He reports neck mass has been unchanged.

## 2020-09-16 ENCOUNTER — OUTPATIENT (OUTPATIENT)
Dept: OUTPATIENT SERVICES | Facility: HOSPITAL | Age: 45
LOS: 1 days | Discharge: HOME | End: 2020-09-16

## 2020-09-16 DIAGNOSIS — D72.89 OTHER SPECIFIED DISORDERS OF WHITE BLOOD CELLS: ICD-10-CM

## 2020-09-16 DIAGNOSIS — Z98.890 OTHER SPECIFIED POSTPROCEDURAL STATES: Chronic | ICD-10-CM

## 2020-09-16 LAB
IF BLOCK AB SER QL: NORMAL
PCA AB SER QL IF: ABNORMAL
VIT B12 SERPL-MCNC: 374 PG/ML

## 2020-09-17 ENCOUNTER — APPOINTMENT (OUTPATIENT)
Dept: SURGERY | Facility: CLINIC | Age: 45
End: 2020-09-17
Payer: COMMERCIAL

## 2020-09-17 ENCOUNTER — APPOINTMENT (OUTPATIENT)
Dept: INFUSION THERAPY | Facility: HOSPITAL | Age: 45
End: 2020-09-17

## 2020-09-17 PROCEDURE — 99213 OFFICE O/P EST LOW 20 MIN: CPT

## 2020-09-17 NOTE — HISTORY OF PRESENT ILLNESS
[de-identified] : s/p incisional biopsy of neck mass. found to have hypereosinophilia. under care of AI and hematology. denies any symptoms.  scheduled to see GI

## 2020-09-17 NOTE — PHYSICAL EXAM
[de-identified] : incision well healed. neck swelling smaller [Midline] : located in midline position [Normal] : orientation to person, place, and time: normal

## 2020-09-21 ENCOUNTER — APPOINTMENT (OUTPATIENT)
Dept: INFUSION THERAPY | Facility: HOSPITAL | Age: 45
End: 2020-09-21

## 2020-09-22 DIAGNOSIS — Z51.11 ENCOUNTER FOR ANTINEOPLASTIC CHEMOTHERAPY: ICD-10-CM

## 2020-09-22 DIAGNOSIS — R11.2 NAUSEA WITH VOMITING, UNSPECIFIED: ICD-10-CM

## 2020-09-22 LAB
ALBUMIN MFR SERPL ELPH: 61.6 %
ALBUMIN SERPL-MCNC: 4.5 G/DL
ALBUMIN/GLOB SERPL: 1.6 RATIO
ALPHA1 GLOB MFR SERPL ELPH: 3.2 %
ALPHA1 GLOB SERPL ELPH-MCNC: 0.2 G/DL
ALPHA2 GLOB MFR SERPL ELPH: 7.8 %
ALPHA2 GLOB SERPL ELPH-MCNC: 0.6 G/DL
B-GLOBULIN MFR SERPL ELPH: 12.1 %
B-GLOBULIN SERPL ELPH-MCNC: 0.9 G/DL
BASOPHILS # BLD AUTO: 0.11 K/UL
BASOPHILS NFR BLD AUTO: 1 %
CD16+CD56+ CELLS # BLD: 204 /UL
CD16+CD56+ CELLS NFR BLD: 12 %
CD19 CELLS NFR BLD: 114 /UL
CD3 CELLS # BLD: 1361 /UL
CD3 CELLS NFR BLD: 79 %
CD3+CD4+ CELLS # BLD: 1173 /UL
CD3+CD4+ CELLS NFR BLD: 70 %
CD3+CD4+ CELLS/CD3+CD8+ CLL SPEC: 9.17 RATIO
CD3+CD8+ CELLS # SPEC: 128 /UL
CD3+CD8+ CELLS NFR BLD: 8 %
CELLS.CD3-CD19+/CELLS IN BLOOD: 7 %
CORTIS SERPL-MCNC: 5.5 UG/DL
DEPRECATED KAPPA LC FREE/LAMBDA SER: 1.16 RATIO
DEPRECATED KAPPA LC FREE/LAMBDA SER: 1.25 RATIO
EOSINOPHIL # BLD AUTO: 3.4 K/UL
EOSINOPHIL NFR BLD AUTO: 29.8 %
FILARIA IGG SER-ACNC: 0.34
GAMMA GLOB FLD ELPH-MCNC: 1.1 G/DL
GAMMA GLOB MFR SERPL ELPH: 15.3 %
HCT VFR BLD CALC: 45.8 %
HGB BLD-MCNC: 14.8 G/DL
HIV1+2 AB SPEC QL IA.RAPID: NONREACTIVE
IGA SER QL IEP: 396 MG/DL
IGA SER QL IEP: 404 MG/DL
IGE SER-MCNC: 5650 KU/L
IGG SER QL IEP: 992 MG/DL
IGG SER QL IEP: 998 MG/DL
IGG SUBSET TOTAL IGG: 1121 MG/DL
IGG SUBSET TOTAL IGG: 1142 MG/DL
IGG1 SER-MCNC: 647 MG/DL
IGG1 SER-MCNC: 649 MG/DL
IGG2 SER-MCNC: 325 MG/DL
IGG2 SER-MCNC: 328 MG/DL
IGG3 SER-MCNC: 30 MG/DL
IGG3 SER-MCNC: 30 MG/DL
IGG4 SER-MCNC: 37 MG/DL
IGG4 SER-MCNC: 37 MG/DL
IGM SER QL IEP: 67 MG/DL
IGM SER QL IEP: 68 MG/DL
IMM GRANULOCYTES NFR BLD AUTO: 0.2 %
INTERPRETATION SERPL IEP-IMP: NORMAL
KAPPA LC CSF-MCNC: 1.1 MG/DL
KAPPA LC CSF-MCNC: 1.26 MG/DL
KAPPA LC SERPL-MCNC: 1.37 MG/DL
KAPPA LC SERPL-MCNC: 1.46 MG/DL
LYMPHOCYTES # BLD AUTO: 1.79 K/UL
LYMPHOCYTES NFR BLD AUTO: 15.7 %
MAN DIFF?: NORMAL
MCHC RBC-ENTMCNC: 31.4 PG
MCHC RBC-ENTMCNC: 32.3 GM/DL
MCV RBC AUTO: 97.2 FL
MONOCYTES # BLD AUTO: 0.65 K/UL
MONOCYTES NFR BLD AUTO: 5.7 %
MPO AB + PR3 PNL SER: NORMAL
NEUTROPHILS # BLD AUTO: 5.45 K/UL
NEUTROPHILS NFR BLD AUTO: 47.6 %
PLATELET # BLD AUTO: 304 K/UL
PROT SERPL-MCNC: 7.3 G/DL
PROT SERPL-MCNC: 7.3 G/DL
RBC # BLD: 4.71 M/UL
RBC # FLD: 11.5 %
SCHISTOSOMA IGG SER QL: NORMAL
STRONGYLOIDES AB SER IA-ACNC: NEGATIVE
T GONDII AB SER-IMP: NEGATIVE
T GONDII AB SER-IMP: POSITIVE
T GONDII IGG SER QL: 164 IU/ML
T GONDII IGM SER QL: <3 AU/ML
TRICHINELLA AB SER QL: NEGATIVE
TRYPTASE: 4.3 NG/ML
VIT B12 SERPL-MCNC: 203 PG/ML
WBC # FLD AUTO: 11.42 K/UL

## 2020-09-23 LAB — METHYLMALONATE SERPL-SCNC: 332 NMOL/L

## 2020-09-28 ENCOUNTER — APPOINTMENT (OUTPATIENT)
Dept: INFUSION THERAPY | Facility: HOSPITAL | Age: 45
End: 2020-09-28

## 2020-10-05 ENCOUNTER — APPOINTMENT (OUTPATIENT)
Dept: HEMATOLOGY ONCOLOGY | Facility: CLINIC | Age: 45
End: 2020-10-05
Payer: COMMERCIAL

## 2020-10-05 ENCOUNTER — APPOINTMENT (OUTPATIENT)
Dept: INFUSION THERAPY | Facility: HOSPITAL | Age: 45
End: 2020-10-05

## 2020-10-05 PROCEDURE — 99441: CPT

## 2020-10-12 ENCOUNTER — RESULT REVIEW (OUTPATIENT)
Age: 45
End: 2020-10-12

## 2020-10-12 ENCOUNTER — APPOINTMENT (OUTPATIENT)
Dept: INFUSION THERAPY | Facility: HOSPITAL | Age: 45
End: 2020-10-12

## 2020-10-12 ENCOUNTER — LABORATORY RESULT (OUTPATIENT)
Age: 45
End: 2020-10-12

## 2020-10-12 LAB
BASOPHILS # BLD AUTO: 0.09 K/UL — SIGNIFICANT CHANGE UP (ref 0–0.2)
BASOPHILS NFR BLD AUTO: 0.8 % — SIGNIFICANT CHANGE UP (ref 0–2)
EOSINOPHIL # BLD AUTO: 4.1 K/UL — HIGH (ref 0–0.5)
EOSINOPHIL NFR BLD AUTO: 35.7 % — HIGH (ref 0–6)
HCT VFR BLD CALC: 45.9 % — SIGNIFICANT CHANGE UP (ref 39–50)
HGB BLD-MCNC: 15 G/DL — SIGNIFICANT CHANGE UP (ref 13–17)
IMM GRANULOCYTES NFR BLD AUTO: 0.4 % — SIGNIFICANT CHANGE UP (ref 0–1.5)
LYMPHOCYTES # BLD AUTO: 1.83 K/UL — SIGNIFICANT CHANGE UP (ref 1–3.3)
LYMPHOCYTES # BLD AUTO: 15.9 % — SIGNIFICANT CHANGE UP (ref 13–44)
MCHC RBC-ENTMCNC: 31.3 PG — SIGNIFICANT CHANGE UP (ref 27–34)
MCHC RBC-ENTMCNC: 32.7 G/DL — SIGNIFICANT CHANGE UP (ref 32–36)
MCV RBC AUTO: 95.8 FL — SIGNIFICANT CHANGE UP (ref 80–100)
MONOCYTES # BLD AUTO: 0.68 K/UL — SIGNIFICANT CHANGE UP (ref 0–0.9)
MONOCYTES NFR BLD AUTO: 5.9 % — SIGNIFICANT CHANGE UP (ref 2–14)
NEUTROPHILS # BLD AUTO: 4.75 K/UL — SIGNIFICANT CHANGE UP (ref 1.8–7.4)
NEUTROPHILS NFR BLD AUTO: 41.3 % — LOW (ref 43–77)
NRBC # BLD: 0 /100 WBCS — SIGNIFICANT CHANGE UP (ref 0–0)
PLATELET # BLD AUTO: 277 K/UL — SIGNIFICANT CHANGE UP (ref 150–400)
RBC # BLD: 4.79 M/UL — SIGNIFICANT CHANGE UP (ref 4.2–5.8)
RBC # FLD: 11.4 % — SIGNIFICANT CHANGE UP (ref 10.3–14.5)
WBC # BLD: 11.5 K/UL — HIGH (ref 3.8–10.5)
WBC # FLD AUTO: 11.5 K/UL — HIGH (ref 3.8–10.5)

## 2020-10-21 ENCOUNTER — OUTPATIENT (OUTPATIENT)
Dept: OUTPATIENT SERVICES | Facility: HOSPITAL | Age: 45
LOS: 1 days | Discharge: HOME | End: 2020-10-21

## 2020-10-21 DIAGNOSIS — D72.89 OTHER SPECIFIED DISORDERS OF WHITE BLOOD CELLS: ICD-10-CM

## 2020-10-21 DIAGNOSIS — Z98.890 OTHER SPECIFIED POSTPROCEDURAL STATES: Chronic | ICD-10-CM

## 2020-10-23 ENCOUNTER — APPOINTMENT (OUTPATIENT)
Dept: HEMATOLOGY ONCOLOGY | Facility: CLINIC | Age: 45
End: 2020-10-23
Payer: COMMERCIAL

## 2020-10-23 PROCEDURE — 99214 OFFICE O/P EST MOD 30 MIN: CPT | Mod: 95

## 2020-10-23 NOTE — ASSESSMENT
[FreeTextEntry1] : 43 y/o M with peripheral eosinophilia and chronic lymphadenopathy of unclear etiology. Also incidentally diagnosed with pernicious anemia. \par \par -Discussed with patient the possibly of hypereosinophilic syndromes, including those involving the lymphatic system (HES-L). Discussed the difficulty in making this diagnosis based on only results from peripheral bloodwork. Patient without indication of any serious systemic symptoms at this time still. \par -Patient refused BMBx in the past.  \par -Peripheral blood shows negative JAK2, BCR-ABL, normal FISH for PDGFRA. \par -Unlikely Myeloid related HES given normal tryptase and low B12 level. VERY high MMA level in the past, and unclear how this relates to lymphadenopathy / mass, but giving IM supplementation. This has improved. Anti-parietal cell antibody positive, consistent with pernicious anemia (although wasn't anemic). \par -Patient to see GI as concern for malabsorption for B12 deficiency, although lower suspicion with positive antibody. POssibly evaluate for GI pathology for confirmation of HES. \par -Ddx: HES NOS, Kimura disease, HES-L, other histiocytosis with eosinophilia. Treatment to most likely include systemic corticosteroids. Once again discussed the need for bone marrow biopsy. Patient will consider after GI evaluation. \par RTC in 2 months.

## 2020-10-23 NOTE — PHYSICAL EXAM
[Fully active, able to carry on all pre-disease performance without restriction] : Status 0 - Fully active, able to carry on all pre-disease performance without restriction [Normal] : affect appropriate [de-identified] : mid-left large mass, soft and nontender with multinodular feel to it.  [de-identified] : small L axillary lymph node palpated deeply, around 0.5 cm. Nontender. Otherwise no other peripheral lymphadenopathy, L anterior cervical chain mass noted.  [de-identified] : Cannot perform physical exam due to nature of telemedicine visit, however on telemedicine patient appears to not be in any acute distress

## 2020-10-23 NOTE — HISTORY OF PRESENT ILLNESS
[de-identified] : 43 y/o M with unremarkable medical history who presents as referral from Dr. Boxer's office with hypereosinophilia. As per the patient he has dealt with swollen lymph nodes under his neck dating back to 2002. During that time he had a surgery to excise the lymph node and he said that it didn't show anything, and he didn't deal with it again until the past "few years". According to the patient, he has now dealt with waxing and waning lymphadenopathy which when it comes is slightly bothersome to him with itching and irritation. Also, he says you can see it and it is cosmetically disfiguring. He denies any recent travel. He denies any fatigue or unintentional weight loss, no night sweats or fevers. He denies any hives, rashes or angioedema like symptoms. He denies dyspnea, either at rest or on exertion, and has no wheezing symptoms. he reports that he is otherwise feeling in his usual state of health. He denies N/V/D, and has had normal stool.\par \par In Nov 2019 he had an FNA of his lymph node (L submental) which showed lymphocytes with scattered larger cells in the background of many eosinophils, with mildly increased mast cells. Dec 2019 had excisional biopsy of left neck mass which showed soft tissue with eosinophilic and lymphocytic infiltrate in a background of vascular proliferation and fibrosis. He is here for hematologic evaluation. \par \par On subsequent evaluation patient was found with severe B12 deficiency with extremely elevated serum MMA level. He is now receiving B12 injections for repletion.  [de-identified] : Patient is still pending GI evaluation. He has completed weekly B12 injections and should be planned for monthly injections. No appointments made yet. \par Patient reports that his back pain that he previously had before B12 injections has improved. \par Patient reports that his neck lymph node masses feel around the same but his doctors have told him it seems like it is going down. \par

## 2020-10-23 NOTE — REVIEW OF SYSTEMS
[Fever] : no fever [Chills] : no chills [Night Sweats] : no night sweats [Fatigue] : no fatigue [Recent Change In Weight] : ~T no recent weight change [Vision Problems] : no vision problems [Dysphagia] : no dysphagia [Nosebleeds] : no nosebleeds [Odynophagia] : no odynophagia [Chest Pain] : no chest pain [Palpitations] : no palpitations [Shortness Of Breath] : no shortness of breath [Wheezing] : no wheezing [Cough] : no cough [SOB on Exertion] : no shortness of breath during exertion [Abdominal Pain] : no abdominal pain [Vomiting] : no vomiting [Diarrhea] : no diarrhea [Dysuria] : no dysuria [Joint Pain] : no joint pain [Joint Stiffness] : no joint stiffness [Skin Rash] : no skin rash [Dizziness] : no dizziness [Fainting] : no fainting [Anxiety] : no anxiety [Depression] : no depression [Easy Bleeding] : no tendency for easy bleeding [Easy Bruising] : no tendency for easy bruising [de-identified] : itching over lesions

## 2020-11-02 ENCOUNTER — APPOINTMENT (OUTPATIENT)
Dept: INFUSION THERAPY | Facility: HOSPITAL | Age: 45
End: 2020-11-02

## 2020-11-03 DIAGNOSIS — Z51.11 ENCOUNTER FOR ANTINEOPLASTIC CHEMOTHERAPY: ICD-10-CM

## 2020-11-03 DIAGNOSIS — R11.2 NAUSEA WITH VOMITING, UNSPECIFIED: ICD-10-CM

## 2020-12-01 ENCOUNTER — OUTPATIENT (OUTPATIENT)
Dept: OUTPATIENT SERVICES | Facility: HOSPITAL | Age: 45
LOS: 1 days | Discharge: HOME | End: 2020-12-01

## 2020-12-01 DIAGNOSIS — Z98.890 OTHER SPECIFIED POSTPROCEDURAL STATES: Chronic | ICD-10-CM

## 2020-12-01 DIAGNOSIS — D72.89 OTHER SPECIFIED DISORDERS OF WHITE BLOOD CELLS: ICD-10-CM

## 2020-12-04 ENCOUNTER — APPOINTMENT (OUTPATIENT)
Dept: GASTROENTEROLOGY | Facility: CLINIC | Age: 45
End: 2020-12-04
Payer: COMMERCIAL

## 2020-12-04 VITALS
HEIGHT: 69 IN | TEMPERATURE: 97.7 F | WEIGHT: 210 LBS | HEART RATE: 97 BPM | DIASTOLIC BLOOD PRESSURE: 88 MMHG | SYSTOLIC BLOOD PRESSURE: 137 MMHG | BODY MASS INDEX: 31.1 KG/M2

## 2020-12-04 PROCEDURE — 99204 OFFICE O/P NEW MOD 45 MIN: CPT

## 2020-12-04 PROCEDURE — 99072 ADDL SUPL MATRL&STAF TM PHE: CPT

## 2020-12-04 NOTE — ASSESSMENT
[FreeTextEntry1] : 1.  B12 deficiency in setting of anti-parietal cell antibodies.  May represent pernicious anemia due to autoimmune gastritis.  While malabsorptive process such as Crohn's disease, celiac disease, parasitic infections or eosinophilic gastroenterocolitis may cause B12 deficiency, patient denies any diarrhea or weight loss.\par 2.  Peripheral eosinophilia with elevated IgE.\par 3.  Lymphadenopathy.\par \par Recs:\par - Labs reviewed with patient.\par - Patient was advised to undergo EGD with biopsies to rule out autoimmune gastritis, celiac disease and eosinophilic infiltration.  Given age > 45, he was advised that he could also undergo colonoscopy, not only for colon cancer screening purposes, but for biopsies from the terminal ileum and colon as well.  Risks and benefits were discussed and brochures were given for both procedures (PEG and Suprep).  The patient wishes to research the procedures as well as the possible conditions before he will agree to the procedures.\par - The patient was advised that he may need to continue with lifelong B12 supplementation.

## 2020-12-04 NOTE — HISTORY OF PRESENT ILLNESS
[Heartburn] : denies heartburn [Nausea] : denies nausea [Vomiting] : denies vomiting [Diarrhea] : denies diarrhea [Constipation] : denies constipation [Yellow Skin Or Eyes (Jaundice)] : denies jaundice [Abdominal Pain] : denies abdominal pain [Abdominal Swelling] : denies abdominal swelling [Rectal Pain] : denies rectal pain [de-identified] : Alba presents to the office today for evaluation of B12 deficiency.\par \par The patient feels well from a GI perspective and denies any dysphagia, nausea, abdominal pain, GI bleeding, weight loss, or family history of GI disorders.  He has had lymphadenopathy with biopsies showing eosinophilic and lymphocytic infiltration.  He reports that this started after dust exposure after 9/11.  In the past year, he has been evaluated by Allergy, ID, and Hematology for hypereosinophilia and elevated IgE.  Serologic testing for parasitic infections has been mostly unremarkable (Toxoplasma IgG +).  During his evaluation, the patient has been noted to have low B12 and elevated MMA levels with positive anti-parietal cell antibodies and negative intrinsic factor blocking antibody.  The patient reports that he eats meat products.  He is currently receiving B12 injections.

## 2020-12-07 ENCOUNTER — APPOINTMENT (OUTPATIENT)
Dept: INFUSION THERAPY | Facility: HOSPITAL | Age: 45
End: 2020-12-07

## 2021-01-05 ENCOUNTER — OUTPATIENT (OUTPATIENT)
Dept: OUTPATIENT SERVICES | Facility: HOSPITAL | Age: 46
LOS: 1 days | Discharge: HOME | End: 2021-01-05

## 2021-01-05 DIAGNOSIS — D72.89 OTHER SPECIFIED DISORDERS OF WHITE BLOOD CELLS: ICD-10-CM

## 2021-01-05 DIAGNOSIS — Z98.890 OTHER SPECIFIED POSTPROCEDURAL STATES: Chronic | ICD-10-CM

## 2021-01-08 ENCOUNTER — APPOINTMENT (OUTPATIENT)
Dept: INFUSION THERAPY | Facility: HOSPITAL | Age: 46
End: 2021-01-08

## 2021-01-11 ENCOUNTER — APPOINTMENT (OUTPATIENT)
Dept: HEMATOLOGY ONCOLOGY | Facility: CLINIC | Age: 46
End: 2021-01-11

## 2021-01-14 ENCOUNTER — TRANSCRIPTION ENCOUNTER (OUTPATIENT)
Age: 46
End: 2021-01-14

## 2021-01-30 ENCOUNTER — OUTPATIENT (OUTPATIENT)
Dept: OUTPATIENT SERVICES | Facility: HOSPITAL | Age: 46
LOS: 1 days | Discharge: HOME | End: 2021-01-30

## 2021-01-30 DIAGNOSIS — Z98.890 OTHER SPECIFIED POSTPROCEDURAL STATES: Chronic | ICD-10-CM

## 2021-01-30 DIAGNOSIS — D72.89 OTHER SPECIFIED DISORDERS OF WHITE BLOOD CELLS: ICD-10-CM

## 2021-02-05 ENCOUNTER — APPOINTMENT (OUTPATIENT)
Dept: INFUSION THERAPY | Facility: HOSPITAL | Age: 46
End: 2021-02-05

## 2021-03-01 ENCOUNTER — OUTPATIENT (OUTPATIENT)
Dept: OUTPATIENT SERVICES | Facility: HOSPITAL | Age: 46
LOS: 1 days | Discharge: HOME | End: 2021-03-01

## 2021-03-01 DIAGNOSIS — Z98.890 OTHER SPECIFIED POSTPROCEDURAL STATES: Chronic | ICD-10-CM

## 2021-03-01 DIAGNOSIS — D72.89 OTHER SPECIFIED DISORDERS OF WHITE BLOOD CELLS: ICD-10-CM

## 2021-03-12 ENCOUNTER — APPOINTMENT (OUTPATIENT)
Dept: INFUSION THERAPY | Facility: HOSPITAL | Age: 46
End: 2021-03-12

## 2021-03-28 ENCOUNTER — OUTPATIENT (OUTPATIENT)
Dept: OUTPATIENT SERVICES | Facility: HOSPITAL | Age: 46
LOS: 1 days | Discharge: HOME | End: 2021-03-28

## 2021-03-28 DIAGNOSIS — Z98.890 OTHER SPECIFIED POSTPROCEDURAL STATES: Chronic | ICD-10-CM

## 2021-03-28 DIAGNOSIS — D72.89 OTHER SPECIFIED DISORDERS OF WHITE BLOOD CELLS: ICD-10-CM

## 2021-04-02 ENCOUNTER — APPOINTMENT (OUTPATIENT)
Dept: INFUSION THERAPY | Facility: HOSPITAL | Age: 46
End: 2021-04-02

## 2021-04-05 DIAGNOSIS — Z51.11 ENCOUNTER FOR ANTINEOPLASTIC CHEMOTHERAPY: ICD-10-CM

## 2021-04-05 DIAGNOSIS — R11.2 NAUSEA WITH VOMITING, UNSPECIFIED: ICD-10-CM

## 2021-04-26 ENCOUNTER — RESULT REVIEW (OUTPATIENT)
Age: 46
End: 2021-04-26

## 2021-04-26 ENCOUNTER — APPOINTMENT (OUTPATIENT)
Dept: HEMATOLOGY ONCOLOGY | Facility: CLINIC | Age: 46
End: 2021-04-26
Payer: COMMERCIAL

## 2021-04-26 VITALS
HEART RATE: 90 BPM | HEIGHT: 68.98 IN | OXYGEN SATURATION: 98 % | BODY MASS INDEX: 30.69 KG/M2 | TEMPERATURE: 96.8 F | DIASTOLIC BLOOD PRESSURE: 83 MMHG | WEIGHT: 207.23 LBS | RESPIRATION RATE: 17 BRPM | SYSTOLIC BLOOD PRESSURE: 122 MMHG

## 2021-04-26 DIAGNOSIS — R22.1 LOCALIZED SWELLING, MASS AND LUMP, NECK: ICD-10-CM

## 2021-04-26 DIAGNOSIS — E53.8 DEFICIENCY OF OTHER SPECIFIED B GROUP VITAMINS: ICD-10-CM

## 2021-04-26 LAB
BASOPHILS # BLD AUTO: 0 K/UL — SIGNIFICANT CHANGE UP (ref 0–0.2)
BASOPHILS NFR BLD AUTO: 0 % — SIGNIFICANT CHANGE UP (ref 0–2)
EOSINOPHIL # BLD AUTO: 4.2 K/UL — HIGH (ref 0–0.5)
EOSINOPHIL NFR BLD AUTO: 32 % — HIGH (ref 0–6)
HCT VFR BLD CALC: 45.8 % — SIGNIFICANT CHANGE UP (ref 39–50)
HGB BLD-MCNC: 15 G/DL — SIGNIFICANT CHANGE UP (ref 13–17)
LYMPHOCYTES # BLD AUTO: 25 % — SIGNIFICANT CHANGE UP (ref 13–44)
LYMPHOCYTES # BLD AUTO: 3.28 K/UL — SIGNIFICANT CHANGE UP (ref 1–3.3)
MCHC RBC-ENTMCNC: 31.1 PG — SIGNIFICANT CHANGE UP (ref 27–34)
MCHC RBC-ENTMCNC: 32.8 G/DL — SIGNIFICANT CHANGE UP (ref 32–36)
MCV RBC AUTO: 95 FL — SIGNIFICANT CHANGE UP (ref 80–100)
MONOCYTES # BLD AUTO: 0.52 K/UL — SIGNIFICANT CHANGE UP (ref 0–0.9)
MONOCYTES NFR BLD AUTO: 4 % — SIGNIFICANT CHANGE UP (ref 2–14)
NEUTROPHILS # BLD AUTO: 5.11 K/UL — SIGNIFICANT CHANGE UP (ref 1.8–7.4)
NEUTROPHILS NFR BLD AUTO: 39 % — LOW (ref 43–77)
NRBC # BLD: 0 /100 — SIGNIFICANT CHANGE UP (ref 0–0)
NRBC # BLD: SIGNIFICANT CHANGE UP /100 WBCS (ref 0–0)
PLAT MORPH BLD: NORMAL — SIGNIFICANT CHANGE UP
PLATELET # BLD AUTO: 296 K/UL — SIGNIFICANT CHANGE UP (ref 150–400)
RBC # BLD: 4.82 M/UL — SIGNIFICANT CHANGE UP (ref 4.2–5.8)
RBC # FLD: 11.3 % — SIGNIFICANT CHANGE UP (ref 10.3–14.5)
RBC BLD AUTO: SIGNIFICANT CHANGE UP
WBC # BLD: 13.11 K/UL — HIGH (ref 3.8–10.5)
WBC # FLD AUTO: 13.11 K/UL — HIGH (ref 3.8–10.5)

## 2021-04-26 PROCEDURE — 99215 OFFICE O/P EST HI 40 MIN: CPT

## 2021-04-26 PROCEDURE — 99072 ADDL SUPL MATRL&STAF TM PHE: CPT

## 2021-04-26 NOTE — ASSESSMENT
[FreeTextEntry1] : 43 y/o M with peripheral eosinophilia and chronic lymphadenopathy of unclear etiology. Also incidentally diagnosed with pernicious anemia. \par \par -Discussed with patient the possibly of hypereosinophilic syndromes, including those involving the lymphatic system (HES-L). Discussed the difficulty in making this diagnosis based on only results from peripheral bloodwork. Patient without indication of any serious systemic symptoms at this time still. \par -Patient refused BMBx in the past, however after extensive discussion today he agrees to receive a bone marrow biopsy. Jamshid schedule as soon as possible. \par -Peripheral blood shows negative JAK2, BCR-ABL, and normal FISH for PDGFRA. \par -Unlikely Myeloid related HES given normal tryptase and low B12 level. VERY high MMA level in the past, and unclear how this relates to lymphadenopathy / mass, but giving IM supplementation. This has improved. Anti-parietal cell antibody positive, consistent with pernicious anemia. Continue with montly repletion for now. \par -Patient to see GI as concern for malabsorption for B12 deficiency, although lower suspicion with positive antibody. Possibly evaluate for GI pathology for confirmation of HES. Patient is hesitant to do this, disccused with him in the office today. \par -Ddx: HES NOS, Kimura disease, HES-L, other histiocytosis with eosinophilia. Treatment to most likely include systemic corticosteroids. Once again discussed the need for bone marrow biopsy. Patient will consider after GI evaluation. \par RTC in 3 months.

## 2021-04-26 NOTE — PHYSICAL EXAM
[Fully active, able to carry on all pre-disease performance without restriction] : Status 0 - Fully active, able to carry on all pre-disease performance without restriction [Normal] : affect appropriate [de-identified] : cervical adenopathy unchanged

## 2021-04-26 NOTE — REVIEW OF SYSTEMS
[Fever] : no fever [Chills] : no chills [Night Sweats] : no night sweats [Fatigue] : no fatigue [Recent Change In Weight] : ~T no recent weight change [Vision Problems] : no vision problems [Dysphagia] : no dysphagia [Nosebleeds] : no nosebleeds [Odynophagia] : no odynophagia [Chest Pain] : no chest pain [Palpitations] : no palpitations [Shortness Of Breath] : no shortness of breath [Wheezing] : no wheezing [Cough] : no cough [SOB on Exertion] : no shortness of breath during exertion [Abdominal Pain] : no abdominal pain [Vomiting] : no vomiting [Diarrhea] : no diarrhea [Dysuria] : no dysuria [Joint Pain] : no joint pain [Joint Stiffness] : no joint stiffness [Skin Rash] : no skin rash [Dizziness] : no dizziness [Fainting] : no fainting [Anxiety] : no anxiety [Depression] : no depression [Easy Bleeding] : no tendency for easy bleeding [Easy Bruising] : no tendency for easy bruising [de-identified] : itching over lesions

## 2021-04-30 ENCOUNTER — OUTPATIENT (OUTPATIENT)
Dept: OUTPATIENT SERVICES | Facility: HOSPITAL | Age: 46
LOS: 1 days | Discharge: HOME | End: 2021-04-30

## 2021-04-30 DIAGNOSIS — D72.89 OTHER SPECIFIED DISORDERS OF WHITE BLOOD CELLS: ICD-10-CM

## 2021-04-30 DIAGNOSIS — Z98.890 OTHER SPECIFIED POSTPROCEDURAL STATES: Chronic | ICD-10-CM

## 2021-05-04 ENCOUNTER — LABORATORY RESULT (OUTPATIENT)
Age: 46
End: 2021-05-04

## 2021-05-04 ENCOUNTER — RESULT REVIEW (OUTPATIENT)
Age: 46
End: 2021-05-04

## 2021-05-04 ENCOUNTER — APPOINTMENT (OUTPATIENT)
Dept: HEMATOLOGY ONCOLOGY | Facility: CLINIC | Age: 46
End: 2021-05-04
Payer: COMMERCIAL

## 2021-05-04 VITALS
SYSTOLIC BLOOD PRESSURE: 127 MMHG | TEMPERATURE: 96.6 F | OXYGEN SATURATION: 97 % | DIASTOLIC BLOOD PRESSURE: 89 MMHG | RESPIRATION RATE: 16 BRPM | HEIGHT: 68.98 IN | WEIGHT: 206.99 LBS | BODY MASS INDEX: 30.66 KG/M2 | HEART RATE: 86 BPM

## 2021-05-04 LAB
BASOPHILS # BLD AUTO: 0.08 K/UL — SIGNIFICANT CHANGE UP (ref 0–0.2)
BASOPHILS NFR BLD AUTO: 0.5 % — SIGNIFICANT CHANGE UP (ref 0–2)
EOSINOPHIL # BLD AUTO: 6.2 K/UL — HIGH (ref 0–0.5)
EOSINOPHIL NFR BLD AUTO: 40.7 % — HIGH (ref 0–6)
HCT VFR BLD CALC: 44.2 % — SIGNIFICANT CHANGE UP (ref 39–50)
HGB BLD-MCNC: 14.7 G/DL — SIGNIFICANT CHANGE UP (ref 13–17)
IMM GRANULOCYTES NFR BLD AUTO: 0.3 % — SIGNIFICANT CHANGE UP (ref 0–1.5)
LYMPHOCYTES # BLD AUTO: 16.5 % — SIGNIFICANT CHANGE UP (ref 13–44)
LYMPHOCYTES # BLD AUTO: 2.51 K/UL — SIGNIFICANT CHANGE UP (ref 1–3.3)
MCHC RBC-ENTMCNC: 31.5 PG — SIGNIFICANT CHANGE UP (ref 27–34)
MCHC RBC-ENTMCNC: 33.3 G/DL — SIGNIFICANT CHANGE UP (ref 32–36)
MCV RBC AUTO: 94.6 FL — SIGNIFICANT CHANGE UP (ref 80–100)
MONOCYTES # BLD AUTO: 0.87 K/UL — SIGNIFICANT CHANGE UP (ref 0–0.9)
MONOCYTES NFR BLD AUTO: 5.7 % — SIGNIFICANT CHANGE UP (ref 2–14)
NEUTROPHILS # BLD AUTO: 5.55 K/UL — SIGNIFICANT CHANGE UP (ref 1.8–7.4)
NEUTROPHILS NFR BLD AUTO: 36.3 % — LOW (ref 43–77)
NRBC # BLD: 0 /100 WBCS — SIGNIFICANT CHANGE UP (ref 0–0)
PLATELET # BLD AUTO: 298 K/UL — SIGNIFICANT CHANGE UP (ref 150–400)
RBC # BLD: 4.67 M/UL — SIGNIFICANT CHANGE UP (ref 4.2–5.8)
RBC # FLD: 11.1 % — SIGNIFICANT CHANGE UP (ref 10.3–14.5)
WBC # BLD: 15.25 K/UL — HIGH (ref 3.8–10.5)
WBC # FLD AUTO: 15.25 K/UL — HIGH (ref 3.8–10.5)

## 2021-05-04 PROCEDURE — 38222 DX BONE MARROW BX & ASPIR: CPT | Mod: RT

## 2021-05-04 PROCEDURE — 99072 ADDL SUPL MATRL&STAF TM PHE: CPT

## 2021-05-04 NOTE — REASON FOR VISIT
[Bone Marrow Biopsy] : bone marrow biopsy [Bone Marrow Aspiration] : bone marrow aspiration [FreeTextEntry2] : peripheral eosinophilia and chronic lymphadenopathy of unclear etiology

## 2021-05-04 NOTE — PROCEDURE
[Bone Marrow Biopsy] : bone marrow biopsy [Bone Marrow Aspiration] : bone marrow aspiration  [Patient] : the patient [Verbal Consent Obtained] : verbal consent was obtained prior to the procedure [Patient identification verified] : patient identification verified [Procedure verified and consent obtained] : procedure verified and consent obtained [Laterality verified and correct site marked] : laterality verified and correct site marked [Correct positioning] : correct positioning [Correct implant and/ or special equipment obtained] : correct impact and/ or special equipment obtained [Prone] : prone [Superior iliac spine was identified] : the superior iliac spine was identified. [Lidocaine was injected and into the periosteum overlying the site.] : Lidocaine was injected and into the periosteum overlying the site. [Aspirate] : aspirate [Cytogenetics] : cytogenetics [FISH] : FISH [Biopsy] : biopsy [Flow Cytometry] : flow cytometry [] : The patient was instructed to remove the bandage the following AM. The patient may bathe. Acetaminophen may be taken for discomfort, as per package directions.If there are any other problems, the patient was instructed to call the office. The patient verbalized understanding, and is aware of the office contact numbers. [Right] : site: right [The right posterior iliac crest was prepped with betadine and draped, using sterile technique.] : The right posterior iliac crest was prepped with betadine and draped, using sterile technique. [FreeTextEntry1] : peripheral eosinophilia and chronic lymphadenopathy of unclear etiology [FreeTextEntry2] : WBC: 15.25\par Hgb: 14.7\par PLT:298 K\par \par Bone marrow biopsy and aspiration completed. Patient tolerated procedure well

## 2021-05-05 ENCOUNTER — LABORATORY RESULT (OUTPATIENT)
Age: 46
End: 2021-05-05

## 2021-05-06 ENCOUNTER — LABORATORY RESULT (OUTPATIENT)
Age: 46
End: 2021-05-06

## 2021-05-07 ENCOUNTER — APPOINTMENT (OUTPATIENT)
Dept: INFUSION THERAPY | Facility: HOSPITAL | Age: 46
End: 2021-05-07

## 2021-05-21 NOTE — H&P PST ADULT - NSICDXPASTSURGICALHX_GEN_ALL_CORE_FT
[phone] ) ( Mr Jairon Hernández)- PAST SURGICAL HISTORY:  H/O lymph node excision neck 2002

## 2021-06-03 LAB
ALBUMIN SERPL ELPH-MCNC: 4.8 G/DL
ALP BLD-CCNC: 65 U/L
ALT SERPL-CCNC: 25 U/L
ANION GAP SERPL CALC-SCNC: 14 MMOL/L
AST SERPL-CCNC: 20 U/L
BILIRUB SERPL-MCNC: 0.7 MG/DL
BUN SERPL-MCNC: 16 MG/DL
CALCIUM SERPL-MCNC: 10 MG/DL
CHLORIDE SERPL-SCNC: 105 MMOL/L
CO2 SERPL-SCNC: 26 MMOL/L
CREAT SERPL-MCNC: 1.1 MG/DL
FOLATE SERPL-MCNC: 10.3 NG/ML
GLUCOSE SERPL-MCNC: 97 MG/DL
METHYLMALONATE SERPL-SCNC: 424 NMOL/L
POTASSIUM SERPL-SCNC: 4.4 MMOL/L
PROT SERPL-MCNC: 7.6 G/DL
SODIUM SERPL-SCNC: 144 MMOL/L
VIT B12 SERPL-MCNC: 381 PG/ML

## 2021-06-04 ENCOUNTER — APPOINTMENT (OUTPATIENT)
Dept: INFUSION THERAPY | Facility: HOSPITAL | Age: 46
End: 2021-06-04

## 2021-07-13 DIAGNOSIS — D72.10 EOSINOPHILIA, UNSPECIFIED: ICD-10-CM

## 2021-07-23 ENCOUNTER — OUTPATIENT (OUTPATIENT)
Dept: OUTPATIENT SERVICES | Facility: HOSPITAL | Age: 46
LOS: 1 days | Discharge: HOME | End: 2021-07-23

## 2021-07-23 DIAGNOSIS — Z98.890 OTHER SPECIFIED POSTPROCEDURAL STATES: Chronic | ICD-10-CM

## 2021-07-23 DIAGNOSIS — D72.89 OTHER SPECIFIED DISORDERS OF WHITE BLOOD CELLS: ICD-10-CM

## 2021-08-02 ENCOUNTER — APPOINTMENT (OUTPATIENT)
Dept: HEMATOLOGY ONCOLOGY | Facility: CLINIC | Age: 46
End: 2021-08-02

## 2022-05-22 NOTE — PHYSICAL EXAM
[Alert] : alert [Well Nourished] : well nourished [Healthy Appearance] : healthy appearance [No Acute Distress] : no acute distress [Well Developed] : well developed [Normal Voice/Communication] : normal voice communication [Normal Mood] : mood was normal Attending and PA/NP shared services statement (NON-critical care): [Normal Affect] : affect was normal [Alert, Awake, Oriented as Age-Appropriate] : alert, awake, oriented as age appropriate [Judgment and Insight Age Appropriate] : judgement and insight is age appropriate [de-identified] : no audible wheezing  [Wheezing] : no wheezing was heard

## 2022-09-16 NOTE — HISTORY OF PRESENT ILLNESS
[de-identified] : 45 y/o M with unremarkable medical history who presents as referral from Dr. Boxer's office with hypereosinophilia. As per the patient he has dealt with swollen lymph nodes under his neck dating back to 2002. During that time he had a surgery to excise the lymph node and he said that it didn't show anything, and he didn't deal with it again until the past "few years". According to the patient, he has now dealt with waxing and waning lymphadenopathy which when it comes is slightly bothersome to him with itching and irritation. Also, he says you can see it and it is cosmetically disfiguring. He denies any recent travel. He denies any fatigue or unintentional weight loss, no night sweats or fevers. He denies any hives, rashes or angioedema like symptoms. He denies dyspnea, either at rest or on exertion, and has no wheezing symptoms. he reports that he is otherwise feeling in his usual state of health. He denies N/V/D, and has had normal stool.\par \par In Nov 2019 he had an FNA of his lymph node (L submental) which showed lymphocytes with scattered larger cells in the background of many eosinophils, with mildly increased mast cells. Dec 2019 had excisional biopsy of left neck mass which showed soft tissue with eosinophilic and lymphocytic infiltrate in a background of vascular proliferation and fibrosis. He is here for hematologic evaluation. \par \par On subsequent evaluation patient was found with severe B12 deficiency with extremely elevated serum MMA level. He is now receiving B12 injections for repletion.  [de-identified] : Saw gastroenterologist since last visit who suggested endoscopic evaluation however he has not received to this point yet. \par No weight loss, no night sweats, no shortness of breath or cough. No diarrhea or constipation. Urinating normally. Feels that his lymph node enlargement in the neck has largely been unchanged.  0

## 2023-08-23 NOTE — ASU PREOPERATIVE ASSESSMENT, ADULT ([facility] ONLY) - PROCEDURE
Discontinue Regimen: Tretinoin 0.05% cream Render In Strict Bullet Format?: No Detail Level: Zone Initiate Treatment: tretinoin 0.1% cream nightly as tolerated \\nWinlevi 1% cream BID Left submental lymph node biopsy

## 2023-09-22 NOTE — BRIEF OPERATIVE NOTE - NSICDXBRIEFPOSTOP_GEN_ALL_CORE_FT
Patient alert, resting in bed. Bedside report received from Hilda CHEN. No needs at this time.    POST-OP DIAGNOSIS:  Neck mass 27-Dec-2019 10:59:04  Alla Grier

## 2025-01-29 ENCOUNTER — APPOINTMENT (OUTPATIENT)
Dept: SURGERY | Facility: CLINIC | Age: 50
End: 2025-01-29
Payer: MEDICAID

## 2025-01-29 ENCOUNTER — NON-APPOINTMENT (OUTPATIENT)
Age: 50
End: 2025-01-29

## 2025-01-29 VITALS
HEART RATE: 103 BPM | OXYGEN SATURATION: 96 % | DIASTOLIC BLOOD PRESSURE: 80 MMHG | HEIGHT: 68.98 IN | TEMPERATURE: 98.7 F | WEIGHT: 193 LBS | SYSTOLIC BLOOD PRESSURE: 117 MMHG | BODY MASS INDEX: 28.58 KG/M2

## 2025-01-29 PROCEDURE — 99203 OFFICE O/P NEW LOW 30 MIN: CPT

## 2025-01-29 RX ORDER — MULTIVITAMIN
TABLET ORAL
Refills: 0 | Status: ACTIVE | COMMUNITY

## 2025-02-03 ENCOUNTER — OUTPATIENT (OUTPATIENT)
Dept: OUTPATIENT SERVICES | Facility: HOSPITAL | Age: 50
LOS: 1 days | Discharge: HOME | End: 2025-02-03

## 2025-02-03 VITALS
HEIGHT: 69 IN | SYSTOLIC BLOOD PRESSURE: 151 MMHG | WEIGHT: 192.9 LBS | HEART RATE: 95 BPM | DIASTOLIC BLOOD PRESSURE: 90 MMHG | TEMPERATURE: 98 F | OXYGEN SATURATION: 98 % | RESPIRATION RATE: 18 BRPM

## 2025-02-03 DIAGNOSIS — K40.90 UNILATERAL INGUINAL HERNIA, WITHOUT OBSTRUCTION OR GANGRENE, NOT SPECIFIED AS RECURRENT: ICD-10-CM

## 2025-02-03 DIAGNOSIS — Z01.818 ENCOUNTER FOR OTHER PREPROCEDURAL EXAMINATION: ICD-10-CM

## 2025-02-03 DIAGNOSIS — Z98.890 OTHER SPECIFIED POSTPROCEDURAL STATES: Chronic | ICD-10-CM

## 2025-02-03 LAB
ANION GAP SERPL CALC-SCNC: 6 MMOL/L — SIGNIFICANT CHANGE UP (ref 5–17)
BASOPHILS # BLD AUTO: 0.12 K/UL — SIGNIFICANT CHANGE UP (ref 0–0.2)
BASOPHILS NFR BLD AUTO: 1 % — SIGNIFICANT CHANGE UP (ref 0–2)
BUN SERPL-MCNC: 17 MG/DL — SIGNIFICANT CHANGE UP (ref 7–23)
CALCIUM SERPL-MCNC: 9.4 MG/DL — SIGNIFICANT CHANGE UP (ref 8.5–10.1)
CHLORIDE SERPL-SCNC: 105 MMOL/L — SIGNIFICANT CHANGE UP (ref 96–108)
CO2 SERPL-SCNC: 30 MMOL/L — SIGNIFICANT CHANGE UP (ref 22–31)
CREAT SERPL-MCNC: 1.04 MG/DL — SIGNIFICANT CHANGE UP (ref 0.5–1.3)
EGFR: 87 ML/MIN/1.73M2 — SIGNIFICANT CHANGE UP
EOSINOPHIL # BLD AUTO: 3.36 K/UL — HIGH (ref 0–0.5)
EOSINOPHIL NFR BLD AUTO: 29 % — HIGH (ref 0–6)
GLUCOSE SERPL-MCNC: 93 MG/DL — SIGNIFICANT CHANGE UP (ref 70–99)
HCT VFR BLD CALC: 44.3 % — SIGNIFICANT CHANGE UP (ref 39–50)
HGB BLD-MCNC: 14.4 G/DL — SIGNIFICANT CHANGE UP (ref 13–17)
LYMPHOCYTES # BLD AUTO: 2.31 K/UL — SIGNIFICANT CHANGE UP (ref 1–3.3)
LYMPHOCYTES # BLD AUTO: 20 % — SIGNIFICANT CHANGE UP (ref 13–44)
MCHC RBC-ENTMCNC: 31 PG — SIGNIFICANT CHANGE UP (ref 27–34)
MCHC RBC-ENTMCNC: 32.5 G/DL — SIGNIFICANT CHANGE UP (ref 32–36)
MCV RBC AUTO: 95.5 FL — SIGNIFICANT CHANGE UP (ref 80–100)
MONOCYTES # BLD AUTO: 0.81 K/UL — SIGNIFICANT CHANGE UP (ref 0–0.9)
MONOCYTES NFR BLD AUTO: 7 % — SIGNIFICANT CHANGE UP (ref 2–14)
NEUTROPHILS # BLD AUTO: 4.86 K/UL — SIGNIFICANT CHANGE UP (ref 1.8–7.4)
NEUTROPHILS NFR BLD AUTO: 42 % — LOW (ref 43–77)
NRBC # BLD: SIGNIFICANT CHANGE UP /100 WBCS (ref 0–0)
NRBC BLD-RTO: SIGNIFICANT CHANGE UP /100 WBCS (ref 0–0)
PLATELET # BLD AUTO: 292 K/UL — SIGNIFICANT CHANGE UP (ref 150–400)
POTASSIUM SERPL-MCNC: 4 MMOL/L — SIGNIFICANT CHANGE UP (ref 3.5–5.3)
POTASSIUM SERPL-SCNC: 4 MMOL/L — SIGNIFICANT CHANGE UP (ref 3.5–5.3)
RBC # BLD: 4.64 M/UL — SIGNIFICANT CHANGE UP (ref 4.2–5.8)
RBC # FLD: 11.7 % — SIGNIFICANT CHANGE UP (ref 10.3–14.5)
SODIUM SERPL-SCNC: 141 MMOL/L — SIGNIFICANT CHANGE UP (ref 135–145)
WBC # BLD: 11.57 K/UL — HIGH (ref 3.8–10.5)
WBC # FLD AUTO: 11.57 K/UL — HIGH (ref 3.8–10.5)

## 2025-02-03 NOTE — H&P PST ADULT - HISTORY OF PRESENT ILLNESS
50 yo male c/o left groin pain 2/2 inguinal hernia - scheduled for left inguinal hernia repair   denies recent travels in the past 30 days. No fever, SOB, cough, flu like symptoms or body rash- covid screen

## 2025-02-03 NOTE — H&P PST ADULT - PROBLEM SELECTOR PLAN 2
Preop instructions provided including NPO status. Hibiclens wash for infection control. Patient aware to stop NSAID, OTC herbals  for 7-10 days, needs to be accompanied  by adult upon discharge.  Patient verbalized understanding  c/c  anesthesiologist to review pst labs, ekg, medical clearances and optimization for surgery

## 2025-02-11 ENCOUNTER — APPOINTMENT (OUTPATIENT)
Dept: SURGERY | Facility: HOSPITAL | Age: 50
End: 2025-02-11